# Patient Record
Sex: FEMALE | Race: WHITE | NOT HISPANIC OR LATINO | Employment: OTHER | URBAN - METROPOLITAN AREA
[De-identification: names, ages, dates, MRNs, and addresses within clinical notes are randomized per-mention and may not be internally consistent; named-entity substitution may affect disease eponyms.]

---

## 2021-05-18 ENCOUNTER — OFFICE VISIT (OUTPATIENT)
Dept: OBGYN CLINIC | Facility: CLINIC | Age: 86
End: 2021-05-18
Payer: MEDICARE

## 2021-05-18 VITALS
HEIGHT: 64 IN | SYSTOLIC BLOOD PRESSURE: 160 MMHG | DIASTOLIC BLOOD PRESSURE: 88 MMHG | BODY MASS INDEX: 33.29 KG/M2 | WEIGHT: 195 LBS

## 2021-05-18 DIAGNOSIS — N95.0 POSTMENOPAUSAL BLEEDING: Primary | ICD-10-CM

## 2021-05-18 DIAGNOSIS — K59.00 CONSTIPATION, UNSPECIFIED CONSTIPATION TYPE: ICD-10-CM

## 2021-05-18 PROBLEM — N81.10 PELVIC ORGAN PROLAPSE QUANTIFICATION STAGE 1 CYSTOCELE: Status: ACTIVE | Noted: 2018-02-16

## 2021-05-18 PROCEDURE — 88342 IMHCHEM/IMCYTCHM 1ST ANTB: CPT | Performed by: PATHOLOGY

## 2021-05-18 PROCEDURE — 88344 IMHCHEM/IMCYTCHM EA MLT ANTB: CPT | Performed by: PATHOLOGY

## 2021-05-18 PROCEDURE — 88305 TISSUE EXAM BY PATHOLOGIST: CPT | Performed by: PATHOLOGY

## 2021-05-18 PROCEDURE — 58100 BIOPSY OF UTERUS LINING: CPT | Performed by: NURSE PRACTITIONER

## 2021-05-18 RX ORDER — ACETAMINOPHEN 500 MG
500 TABLET ORAL EVERY 6 HOURS PRN
COMMUNITY

## 2021-05-18 RX ORDER — SIMVASTATIN 10 MG
TABLET ORAL
COMMUNITY
Start: 2021-02-27

## 2021-05-18 NOTE — PROGRESS NOTES
Endometrial biopsy    Date/Time: 5/18/2021 3:01 PM  Performed by: ANGELICA Anaya  Authorized by: ANGELICA Anaya   Universal Protocol:  Consent: Verbal consent obtained  Risks and benefits: risks, benefits and alternatives were discussed  Consent given by: patient  Timeout called at: 5/18/2021 3:01 PM   Patient understanding: patient states understanding of the procedure being performed  Patient consent: the patient's understanding of the procedure matches consent given  Procedure consent: procedure consent matches procedure scheduled  Relevant documents: relevant documents present and verified  Test results: test results available and properly labeled  Radiology Images displayed and confirmed  If images not available, report reviewed: imaging studies available  Required items: required blood products, implants, devices, and special equipment available  Patient identity confirmed: verbally with patient      Indication:     Indications: Post-menopausal bleeding      Chronicity of post-menopausal bleeding:  New    Progression of post-menopausal bleeding:  Waxing and waning  Procedure:     Procedure: endometrial biopsy with Pipelle      A bivalve speculum was placed in the vagina: yes      Cervix cleaned and prepped: yes      A paracervical block was performed: no      An intracervical block was performed: no      The cervix was dilated: yes      Uterus sounded: yes      Uterus sound depth (cm):  9    Specimen collected: specimen collected and sent to pathology      Patient tolerated procedure well with no complications: yes    Findings:     Uterus size:  Non-gravid  Comments:     Procedure comments: Will check pelvic u/s  EMB done  Will call her with a plan once results are in

## 2021-05-18 NOTE — PATIENT INSTRUCTIONS
Postmenopausal Bleeding   WHAT YOU NEED TO KNOW:   What do I need to know about postmenopausal bleeding? Postmenopausal bleeding is bleeding that occurs after menopause  A woman who has not had a period for a full year after the age of 36 is considered to be in menopause  Postmenopausal bleeding may range from spotting to very heavy bleeding  What causes postmenopausal bleeding? · Thinning of the endometrium (lining of the uterus) called endometrial atrophy    · Polyps (noncancerous growths) that develop on the inner wall of your uterus or cervix    · Hormone replacement therapy    · Abnormal thickening of the endometrium called endometrial hyperplasia    · Tamoxifen (medicine used to treat breast cancer)    · Cervical, endometrial, or uterine cancer    How is postmenopausal bleeding diagnosed? Your healthcare provider will ask about medical conditions that you have, and that run in your family  He will also do a pelvic exam to check for problems with your cervix, uterus, and ovaries  You may also need any of the following:  · An ultrasound  uses sound waves to show pictures of your uterus on a monitor  Your healthcare provider may place saline fluid into your uterus with a catheter  The fluid helps show more detail in the ultrasound pictures of your uterus  · Endometrial biopsy  is used to collect a sample of cells from the inside of your uterus to be tested for cancer  · Hysteroscopy  is a procedure that is done to look inside your uterus  A small scope with a light and camera is placed into your vagina and cervix  Liquid or gas may be put through the scope to help healthcare providers see better  · Dilation and curettage (D&C)  is a procedure to remove tissue from the lining of your uterus  The tissue is sent to a lab for tests  How is postmenopausal bleeding treated? Treatment depends on the cause of your postmenopausal bleeding  If you have polyps, you may need surgery to remove them  Endometrial atrophy can be treated with medicines  Endometrial hyperplasia may be treated with progestin hormone therapy  Surgery to remove your uterus will be needed if you have endometrial or uterine cancer  Your fallopian tubes, ovaries, and nearby lymph nodes may also be removed  When should I contact my healthcare provider? · You continue to have vaginal bleeding, even with treatment  · You have pain in your abdomen or pelvis  · You have questions or concerns about your condition or care  CARE AGREEMENT:   You have the right to help plan your care  Learn about your health condition and how it may be treated  Discuss treatment options with your healthcare providers to decide what care you want to receive  You always have the right to refuse treatment  The above information is an  only  It is not intended as medical advice for individual conditions or treatments  Talk to your doctor, nurse or pharmacist before following any medical regimen to see if it is safe and effective for you  © Copyright [address] Information is for End User's use only and may not be sold, redistributed or otherwise used for commercial purposes   All illustrations and images included in CareNotes® are the copyrighted property of [employer]  or [address] [place] [address]

## 2021-05-26 ENCOUNTER — TELEPHONE (OUTPATIENT)
Dept: OBGYN CLINIC | Facility: CLINIC | Age: 86
End: 2021-05-26

## 2021-05-26 NOTE — TELEPHONE ENCOUNTER
----- Message from Sara Figueroa sent at 5/26/2021  3:21 PM EDT -----  Regarding: U/S appointment  Please find out when patient plans to do her pelvic u/s? Her EMB result is back but I need more info with her u/s so we can come up with a plan  Thanks

## 2021-05-26 NOTE — TELEPHONE ENCOUNTER
Pt forgot about the us, I gave her the phone # for scheduling and asked to to call today to make the apt

## 2021-06-07 ENCOUNTER — HOSPITAL ENCOUNTER (OUTPATIENT)
Dept: RADIOLOGY | Facility: MEDICAL CENTER | Age: 86
Discharge: HOME | End: 2021-06-07
Payer: MEDICARE

## 2021-06-07 DIAGNOSIS — N95.0 POSTMENOPAUSAL BLEEDING: ICD-10-CM

## 2021-06-07 PROCEDURE — 76856 US EXAM PELVIC COMPLETE: CPT

## 2021-06-07 PROCEDURE — 76830 TRANSVAGINAL US NON-OB: CPT

## 2021-06-16 ENCOUNTER — TELEPHONE (OUTPATIENT)
Dept: OBGYN CLINIC | Facility: CLINIC | Age: 86
End: 2021-06-16

## 2021-06-16 NOTE — TELEPHONE ENCOUNTER
Called radiology and they are getting it corrected  Radiology sent me a msg that they did NOT see a normal uterus, thus none present  Per msg  [facility] :  "We did not see one, maybe a CT might help  I saw an EPIC not of hysterectomy by GYN from 10/15/18 "      ----- Message from [facility] sent at 6/16/2021  8:58 AM EDT -----  Pls call to have report corrected   It says nml uterus but patient had a hysterectomy

## 2021-07-19 ENCOUNTER — ANNUAL EXAM (OUTPATIENT)
Dept: OBGYN CLINIC | Facility: CLINIC | Age: 86
End: 2021-07-19
Payer: MEDICARE

## 2021-07-19 VITALS
WEIGHT: 194 LBS | BODY MASS INDEX: 33.12 KG/M2 | DIASTOLIC BLOOD PRESSURE: 74 MMHG | SYSTOLIC BLOOD PRESSURE: 130 MMHG | HEIGHT: 64 IN

## 2021-07-19 DIAGNOSIS — Z78.0 ASYMPTOMATIC POSTMENOPAUSAL STATUS: ICD-10-CM

## 2021-07-19 DIAGNOSIS — Z01.419 ENCOUNTER FOR GYNECOLOGICAL EXAMINATION WITHOUT ABNORMAL FINDING: Primary | ICD-10-CM

## 2021-07-19 DIAGNOSIS — Z12.31 SCREENING MAMMOGRAM, ENCOUNTER FOR: ICD-10-CM

## 2021-07-19 PROCEDURE — G0101 CA SCREEN;PELVIC/BREAST EXAM: HCPCS | Performed by: NURSE PRACTITIONER

## 2021-07-19 RX ORDER — FUROSEMIDE 20 MG/1
20 TABLET ORAL DAILY
COMMUNITY
End: 2021-12-09 | Stop reason: ALTCHOICE

## 2021-07-19 RX ORDER — POLYETHYLENE GLYCOL 3350 17 G/17G
POWDER, FOR SOLUTION ORAL
COMMUNITY

## 2021-07-19 RX ORDER — ICOSAPENT ETHYL 1000 MG/1
CAPSULE ORAL
COMMUNITY

## 2021-07-19 NOTE — PATIENT INSTRUCTIONS
Breast Self Exam for Women   AMBULATORY CARE:   A breast self-exam (BSE)  is a way to check your breasts for lumps and other changes  Regular BSEs can help you know how your breasts normally look and feel  Most breast lumps or changes are not cancer, but you should always have them checked by a healthcare provider  Why you should do a BSE:  Breast cancer is the most common type of cancer in women  Even if you have mammograms, you may still want to do a BSE regularly  If you know how your breasts normally feel and look, it may help you know when to contact your healthcare provider  Mammograms can miss some cancers  You may find a lump during a BSE that did not show up on a mammogram   When you should do a BSE:  If you have periods, you may want to do your BSE 1 week after your period ends  This is the time when your breasts may be the least swollen, lumpy, or tender  You can do regular BSEs even if you are breastfeeding or have breast implants  Call your doctor if:   · You find any lumps or changes in your breasts  · You have breast pain or fluid coming from your nipples  · You have questions or concerns about your condition or care  How to do a BSE:       · Look at your breasts in a mirror  Look at the size and shape of each breast and nipple  Check for swelling, lumps, dimpling, scaly skin, or other skin changes  Look for nipple changes, such as a nipple that is painful or beginning to pull inward  Gently squeeze both nipples and check to see if fluid (that is not breast milk) comes out of them  If you find any of these or other breast changes, contact your healthcare provider  Check your breasts while you sit or  the following 3 positions:    ? Hang your arms down at your sides  ? Raise your hands and join them behind your head  ? Put firm pressure with your hands on your hips  Bend slightly forward while you look at your breasts in the mirror  · Lie down and feel your breasts    When you lie down, your breast tissue spreads out evenly over your chest  This makes it easier for you to feel for lumps and anything that may not be normal for your breasts  Do a BSE on one breast at a time  ? Place a small pillow or towel under your left shoulder  Put your left arm behind your head  ? Use the 3 middle fingers of your right hand  Use your fingertip pads, on the top of your fingers  Your fingertip pad is the most sensitive part of your finger  ? Use small circles to feel your breast tissue  Use your fingertip pads to make dime-sized, overlapping circles on your breast and armpits  Use light, medium, and firm pressure  First, press lightly  Second, press with medium pressure to feel a little deeper into the breast  Last, use firm pressure to feel deep within your breast     ? Examine your entire breast area  Examine the breast area from above the breast to below the breast where you feel only ribs  Make small circles with your fingertips, starting in the middle of your armpit  Make circles going up and down the breast area  Continue toward your breast and all the way across it  Examine the area from your armpit all the way over to the middle of your chest (breastbone)  Stop at the middle of your chest     ? Move the pillow or towel to your right shoulder, and put your right arm behind your head  Use the 3 fingertip pads of your left hand, and repeat the above steps to do a BSE on your right breast   What else you can do to check for breast problems or cancer:  Talk to your healthcare provider about mammograms  A mammogram is an x-ray of your breasts to screen for breast cancer or other problems  Your provider can tell you the benefits and risks of mammograms  The first mammogram is usually at age 39 or 48  Your provider may recommend you start at 36 or younger if your risk for breast cancer is high  Mammograms usually continue every 1 to 2 years until age 76       Follow up with your doctor as directed:  Write down your questions so you remember to ask them during your visits  © Copyright [address] 2021 Information is for End User's use only and may not be sold, redistributed or otherwise used for commercial purposes  All illustrations and images included in CareNotes® are the copyrighted property of A D A M , Inc  or Destiny Martinez  The above information is an  only  It is not intended as medical advice for individual conditions or treatments  Talk to your doctor, nurse or pharmacist before following any medical regimen to see if it is safe and effective for you

## 2021-07-19 NOTE — PROGRESS NOTES
Diagnoses and all orders for this visit:    Encounter for gynecological examination without abnormal finding    Asymptomatic postmenopausal status    Screening mammogram, encounter for  -     Mammo screening bilateral w 3d & cad; Future    Call as needed, encouraged calcium/vit D in her diet, call with any PMB, all questions answered      Pleasant 80 y o  postmenopausal female here for annual exam  She denies further postmenopausal bleeding  History of hysterectomy and Bladder Cancer  Denies history of abnormal pap smears, last Pap years ago, no further paps indicated  Denies vaginal issues  Denies pelvic pain  Denies postmenopausal issues  Not sexually active  Last colonoscopy unknown  Last mammogram 11/2020    History reviewed  No pertinent past medical history  Past Surgical History:   Procedure Laterality Date    BACK SURGERY      COLONOSCOPY W/ POLYPECTOMY      HYSTERECTOMY W/ SALPINGO-OOPHERECTOMY       History reviewed  No pertinent family history    Social History     Tobacco Use    Smoking status: Never Smoker    Smokeless tobacco: Never Used   Substance Use Topics    Alcohol use: Never    Drug use: Never       Current Outpatient Medications:     acetaminophen (TYLENOL) 500 mg tablet, Take 500 mg by mouth every 6 (six) hours as needed for mild pain, Disp: , Rfl:     furosemide (LASIX) 20 mg tablet, Take 20 mg by mouth daily, Disp: , Rfl:     polyethylene glycol (MiraLax Mix-In [place]) 17 g packet, Take by mouth, Disp: , Rfl:     simvastatin (ZOCOR) 10 mg tablet, TAKE ONE TABLET WITH SUPPER, Disp: , Rfl:     Icosapent Ethyl 1 g CAPS, Take by mouth (Patient not taking: Reported on 7/19/2021), Disp: , Rfl:   Patient Active Problem List    Diagnosis Date Noted    Pelvic organ prolapse quantification stage 1 cystocele 02/16/2018    Bladder cancer ([facility] [address] ) 08/09/2016    Rectocele 08/09/2016       Allergies   Allergen Reactions    Bactrim [Sulfamethoxazole-Trimethoprim] Swelling    Sulfa Antibiotics Swelling       OB History    Para Term  AB Living   6 6       6   SAB TAB Ectopic Multiple Live Births                  # Outcome Date GA Lbr Simone/2nd Weight Sex Delivery Anes PTL Lv   6 Para            5 Para            4 Para            3 Para            2 Para            1 Para              Too many grandchildren and greats, lost count    Vitals:    21 1333   BP: 130/74   BP Location: Left arm   Patient Position: Sitting   Cuff Size: Standard   Weight: 88 kg (194 lb)   Height: 5' 4" (1 626 m)     Body mass index is 33 3 kg/m²  Review of Systems   Constitutional: Negative for chills, fatigue, fever and unexpected weight change  Respiratory: Negative for shortness of breath  Gastrointestinal: Negative for anal bleeding, blood in stool, constipation and diarrhea  Genitourinary: Negative for difficulty urinating, dysuria and hematuria  Physical Exam   Constitutional: She appears well-developed and well-nourished  No distress  HENT: atraumatic, EOMI  Head: Normocephalic  Neck: Normal range of motion  Neck supple  Pulmonary: Effort normal   Breasts: bilateral without masses, skin changes or nipple discharge  Bilaterally soft and warm to touch  No areas of erythema or pain  Abdominal: Soft  Pelvic exam was performed with patient supine  No labial fusion  There is no rash, tenderness, lesion or injury on the right labia  There is no rash, tenderness, lesion or injury on the left labia  Urethral meatus does not show any tenderness, inflammation or discharge  Palpation of midline bladder without pain or discomfort  Uterus/cervix ABSENT  Right adnexum displays no mass, no tenderness and no fullness  Left adnexum displays no mass, no tenderness and no fullness  No erythema or tenderness in the vagina  No foreign body in the vagina  No signs of injury around the vagina or anus  Perineum without lesions, signs of injury, erythema or swelling  No vaginal discharge found   MODERATE VAGINAL ATROPHY  Lymphadenopathy:        Right: No inguinal adenopathy present  Left: No inguinal adenopathy present

## 2021-11-29 ENCOUNTER — TELEPHONE (OUTPATIENT)
Dept: OTHER | Facility: OTHER | Age: 86
End: 2021-11-29

## 2021-12-09 ENCOUNTER — OFFICE VISIT (OUTPATIENT)
Dept: GASTROENTEROLOGY | Facility: CLINIC | Age: 86
End: 2021-12-09
Payer: MEDICARE

## 2021-12-09 VITALS
RESPIRATION RATE: 18 BRPM | SYSTOLIC BLOOD PRESSURE: 146 MMHG | BODY MASS INDEX: 31.38 KG/M2 | WEIGHT: 183.8 LBS | HEIGHT: 64 IN | DIASTOLIC BLOOD PRESSURE: 82 MMHG

## 2021-12-09 DIAGNOSIS — K59.00 CONSTIPATION, UNSPECIFIED CONSTIPATION TYPE: ICD-10-CM

## 2021-12-09 DIAGNOSIS — K59.04 CHRONIC IDIOPATHIC CONSTIPATION: Primary | ICD-10-CM

## 2021-12-09 PROCEDURE — 99204 OFFICE O/P NEW MOD 45 MIN: CPT | Performed by: PHYSICIAN ASSISTANT

## 2023-02-20 RX ORDER — POLYETHYLENE GLYCOL 3350 17 G/17G
POWDER, FOR SOLUTION ORAL
COMMUNITY

## 2023-02-20 RX ORDER — BEMPEDOIC ACID AND EZETIMIBE 180; 10 MG/1; MG/1
TABLET, FILM COATED ORAL
COMMUNITY

## 2023-02-20 RX ORDER — LACTULOSE 10 G/15ML
SOLUTION ORAL
COMMUNITY

## 2023-02-20 RX ORDER — DIAZEPAM 5 MG/1
TABLET ORAL
COMMUNITY

## 2023-02-20 RX ORDER — CIPROFLOXACIN 500 MG/1
TABLET, FILM COATED ORAL
COMMUNITY

## 2023-02-20 RX ORDER — VILAZODONE HYDROCHLORIDE 10 MG/1
TABLET ORAL
COMMUNITY

## 2023-02-20 RX ORDER — PREGABALIN 75 MG/1
CAPSULE ORAL
COMMUNITY

## 2023-02-20 RX ORDER — VILAZODONE HYDROCHLORIDE 20 MG/1
20 TABLET ORAL
COMMUNITY

## 2023-02-20 RX ORDER — FLUCONAZOLE 150 MG/1
TABLET ORAL
COMMUNITY

## 2023-02-20 RX ORDER — MIRABEGRON 50 MG/1
TABLET, FILM COATED, EXTENDED RELEASE ORAL
COMMUNITY

## 2023-02-20 RX ORDER — ASPIRIN 81 MG/1
TABLET ORAL EVERY 24 HOURS
COMMUNITY

## 2023-02-20 RX ORDER — KETOCONAZOLE 20 MG/G
CREAM TOPICAL
COMMUNITY

## 2023-02-20 RX ORDER — OXYCODONE HYDROCHLORIDE 5 MG/1
TABLET ORAL
COMMUNITY

## 2023-02-20 RX ORDER — PYRAZINAMIDE 500 MG/1
1-2 TABLET ORAL EVERY 4 HOURS PRN
COMMUNITY

## 2023-02-20 RX ORDER — FENOFIBRATE 90 MG/1
CAPSULE ORAL
COMMUNITY

## 2023-02-20 RX ORDER — NAPROXEN SODIUM 220 MG
TABLET ORAL
COMMUNITY

## 2023-02-20 RX ORDER — ZOSTER VACCINE RECOMBINANT, ADJUVANTED 50 MCG/0.5
KIT INTRAMUSCULAR
COMMUNITY

## 2023-02-23 ENCOUNTER — TELEPHONE (OUTPATIENT)
Dept: GASTROENTEROLOGY | Facility: AMBULARY SURGERY CENTER | Age: 88
End: 2023-02-23

## 2023-02-23 NOTE — TELEPHONE ENCOUNTER
Patients GI provider:  Dr Cha     Number to return call: [phone]    Reason for call: Pt's partner named Godfrey Escalona called requesting to speak with Barbara Viveros to clarify where pt is going tomorrow because on the message that was left it said that pt should be at the hospital      Scheduled procedure/appointment date if applicable: Apt/procedure 2-

## 2023-02-24 ENCOUNTER — HOSPITAL ENCOUNTER (OUTPATIENT)
Dept: RADIOLOGY | Facility: HOSPITAL | Age: 88
End: 2023-02-24

## 2023-02-24 ENCOUNTER — OFFICE VISIT (OUTPATIENT)
Dept: GASTROENTEROLOGY | Facility: CLINIC | Age: 88
End: 2023-02-24

## 2023-02-24 ENCOUNTER — APPOINTMENT (OUTPATIENT)
Dept: LAB | Facility: HOSPITAL | Age: 88
End: 2023-02-24

## 2023-02-24 VITALS
WEIGHT: 185.6 LBS | SYSTOLIC BLOOD PRESSURE: 140 MMHG | OXYGEN SATURATION: 98 % | BODY MASS INDEX: 30.92 KG/M2 | HEIGHT: 65 IN | DIASTOLIC BLOOD PRESSURE: 80 MMHG | HEART RATE: 82 BPM

## 2023-02-24 DIAGNOSIS — R79.9 ABNORMAL FINDING OF BLOOD CHEMISTRY, UNSPECIFIED: ICD-10-CM

## 2023-02-24 DIAGNOSIS — R19.7 OVERFLOW DIARRHEA: ICD-10-CM

## 2023-02-24 DIAGNOSIS — K59.00 CONSTIPATION, UNSPECIFIED CONSTIPATION TYPE: Primary | ICD-10-CM

## 2023-02-24 DIAGNOSIS — K59.00 CONSTIPATION, UNSPECIFIED CONSTIPATION TYPE: ICD-10-CM

## 2023-02-24 DIAGNOSIS — K64.8 INTERNAL HEMORRHOIDS: ICD-10-CM

## 2023-02-24 LAB
ALBUMIN SERPL BCP-MCNC: 3.6 G/DL (ref 3.5–5)
ALP SERPL-CCNC: 75 U/L (ref 46–116)
ALT SERPL W P-5'-P-CCNC: 18 U/L (ref 12–78)
ANION GAP SERPL CALCULATED.3IONS-SCNC: 9 MMOL/L (ref 4–13)
AST SERPL W P-5'-P-CCNC: 23 U/L (ref 5–45)
BILIRUB SERPL-MCNC: 0.44 MG/DL (ref 0.2–1)
BUN SERPL-MCNC: 15 MG/DL (ref 5–25)
CALCIUM SERPL-MCNC: 8.9 MG/DL (ref 8.3–10.1)
CHLORIDE SERPL-SCNC: 102 MMOL/L (ref 96–108)
CO2 SERPL-SCNC: 27 MMOL/L (ref 21–32)
CREAT SERPL-MCNC: 0.93 MG/DL (ref 0.6–1.3)
ERYTHROCYTE [DISTWIDTH] IN BLOOD BY AUTOMATED COUNT: 12.4 % (ref 11.6–15.1)
FERRITIN SERPL-MCNC: 140 NG/ML (ref 8–388)
GFR SERPL CREATININE-BSD FRML MDRD: 55 ML/MIN/1.73SQ M
GLUCOSE SERPL-MCNC: 117 MG/DL (ref 65–140)
HCT VFR BLD AUTO: 40.8 % (ref 34.8–46.1)
HGB BLD-MCNC: 13.3 G/DL (ref 11.5–15.4)
IRON SATN MFR SERPL: 29 % (ref 15–50)
IRON SERPL-MCNC: 79 UG/DL (ref 50–170)
MCH RBC QN AUTO: 29 PG (ref 26.8–34.3)
MCHC RBC AUTO-ENTMCNC: 32.6 G/DL (ref 31.4–37.4)
MCV RBC AUTO: 89 FL (ref 82–98)
PLATELET # BLD AUTO: 276 THOUSANDS/UL (ref 149–390)
PMV BLD AUTO: 8.7 FL (ref 8.9–12.7)
POTASSIUM SERPL-SCNC: 4.3 MMOL/L (ref 3.5–5.3)
PROT SERPL-MCNC: 7.7 G/DL (ref 6.4–8.4)
RBC # BLD AUTO: 4.58 MILLION/UL (ref 3.81–5.12)
SODIUM SERPL-SCNC: 138 MMOL/L (ref 135–147)
TIBC SERPL-MCNC: 277 UG/DL (ref 250–450)
TSH SERPL DL<=0.05 MIU/L-ACNC: 2.04 UIU/ML (ref 0.45–4.5)
WBC # BLD AUTO: 10.07 THOUSAND/UL (ref 4.31–10.16)

## 2023-02-24 RX ORDER — HYDROCORTISONE 25 MG/G
CREAM TOPICAL 2 TIMES DAILY
Qty: 28 G | Refills: 0 | Status: SHIPPED | OUTPATIENT
Start: 2023-02-24

## 2023-02-24 RX ORDER — HYDROCORTISONE ACETATE 25 MG/1
25 SUPPOSITORY RECTAL 2 TIMES DAILY
Qty: 12 SUPPOSITORY | Refills: 0 | Status: SHIPPED | OUTPATIENT
Start: 2023-02-24

## 2023-02-24 NOTE — PROGRESS NOTES
[facility]) [facility]  Jennifer Restrepo 80 y o  female MRN: [id number]       CC: Alternating diarrhea and constipation, rectal bleeding    HPI: Moncho Rm is an 80year old female who presents to the office today with her  and one of their friends who was able to drive him for the office visit  Patient presents to the office for alternating diarrhea and constipation, that has been chronic   reports that she will have small bits of stool, followed by a day or 2 of large amount of diarrhea  Patient reports that she had 1 week of bright red blood per rectum without clotting  She denies melena or hematochezia  She reports that she does not take MiraLAX or Colace consistently since she is afraid of having diarrhea  She denies abdominal pain  Her last colonoscopy was in 2016  She has a personal history of colon polyps  Internal hemorrhoids were noted at the time  There is no family history of colon cancer to the patient or patient's  knowledge  Review of Systems:    CONSTITUTIONAL: Denies any fever, chills, or rigors  Good appetite, and no recent weight loss  HEENT: No earache or tinnitus  Denies hearing loss or visual disturbances  CARDIOVASCULAR: No chest pain or palpitations  RESPIRATORY: Denies any cough, hemoptysis, shortness of breath or dyspnea on exertion  GASTROINTESTINAL: As noted in the History of Present Illness  GENITOURINARY: No problems with urination  Denies any hematuria or dysuria  NEUROLOGIC: No dizziness or vertigo, denies headaches  MUSCULOSKELETAL: Denies any muscle or joint pain  SKIN: Denies skin rashes or itching  ENDOCRINE: Denies excessive thirst  Denies intolerance to heat or cold  PSYCHOSOCIAL: Denies depression or anxiety  Denies any recent memory loss         Current Outpatient Medications   Medication Sig Dispense Refill   • acetaminophen (TYLENOL) 500 mg tablet Take 500 mg by mouth every 6 (six) hours as needed for mild pain     • acetaminophen-codeine (TYLENOL with CODEINE #3) 300-30 MG per tablet Take 1-2 tablets by mouth every 4 (four) hours as needed     • aspirin (ECOTRIN LOW STRENGTH) 81 mg EC tablet every 24 hours     • Bempedoic Acid-Ezetimibe (Nexlizet) 180-10 MG TABS Nexlizet 180 mg-10 mg tablet   TAKE ONE (1) TABLET DAILY WITH SUPPER (STOP VASCEPA AND SIMVASTATIN)     • Docusate Sodium (COLACE PO) Take by mouth     • hydrocortisone (ANUSOL-HC) 2 5 % rectal cream Apply topically 2 (two) times a day 28 g 0   • hydrocortisone (ANUSOL-HC) 25 mg suppository Insert 1 suppository (25 mg total) into the rectum 2 (two) times a day 12 suppository 0   • lactulose (CHRONULAC) 10 g/15 mL solution lactulose 10 gram/15 mL oral solution     • naproxen sodium (ALEVE) 220 MG tablet Take by mouth     • polyethylene glycol (MIRALAX) 17 g packet Take by mouth     • vilazodone (VIIBRYD) 10 mg tablet Viibryd 10 mg tablet     • Zoster Vac Recomb Adjuvanted (Shingrix) 50 MCG/0 5ML SUSR Shingrix (PF) 50 mcg/0 5 mL intramuscular suspension, kit     • ciprofloxacin (CIPRO) 500 mg tablet ciprofloxacin 500 mg tablet (Patient not taking: Reported on 2/24/2023)     • diazepam (VALIUM) 5 mg tablet diazepam 5 mg tablet (Patient not taking: Reported on 2/24/2023)     • Diclofenac Sodium (VOLTAREN) 1 % diclofenac 1 % topical gel (Patient not taking: Reported on 2/24/2023)     • Fenofibrate Micronized 90 MG CAPS Antara 90 mg capsule (Patient not taking: Reported on 2/24/2023)     • fluconazole (DIFLUCAN) 150 mg tablet fluconazole 150 mg tablet (Patient not taking: Reported on 2/24/2023)     • Icosapent Ethyl 1 g CAPS Take by mouth   (Patient not taking: Reported on 2/24/2023)     • ketoconazole (NIZORAL) 2 % cream ketoconazole 2 % topical cream (Patient not taking: Reported on 2/24/2023)     • Mirabegron ER (Myrbetriq) 50 MG TB24 Take by mouth (Patient not taking: Reported on 2/24/2023)     • oxyCODONE (ROXICODONE) 5 immediate release tablet oxycodone 5 mg tablet (Patient not taking: Reported on 2/24/2023)     • polyethylene glycol (MIRALAX) 17 g packet Take by mouth (Patient not taking: Reported on 12/9/2021)     • pregabalin (LYRICA) 75 mg capsule Lyrica 75 mg capsule (Patient not taking: Reported on 2/24/2023)     • simvastatin (ZOCOR) 10 mg tablet TAKE ONE TABLET WITH SUPPER (Patient not taking: Reported on 2/24/2023)     • vilazodone (VIIBRYD) 20 mg tablet Take 20 mg by mouth (Patient not taking: Reported on 2/24/2023)       No current facility-administered medications for this visit  History reviewed  No pertinent past medical history  Past Surgical History:   Procedure Laterality Date   • BACK SURGERY     • COLONOSCOPY W/ POLYPECTOMY     • HYSTERECTOMY W/ SALPINGO-OOPHERECTOMY       Social History     Socioeconomic History   • Marital status: /Civil Union     Spouse name: None   • Number of children: None   • Years of education: None   • Highest education level: None   Occupational History   • None   Tobacco Use   • Smoking status: Never   • Smokeless tobacco: Never   Vaping Use   • Vaping Use: Never used   Substance and Sexual Activity   • Alcohol use: Never   • Drug use: Never   • Sexual activity: Not Currently   Other Topics Concern   • None   Social History Narrative   • None     Social Determinants of Health     Financial Resource Strain: Not on file   Food Insecurity: Not on file   Transportation Needs: Not on file   Physical Activity: Not on file   Stress: Not on file   Social Connections: Not on file   Intimate Partner Violence: Not on file   Housing Stability: Not on file     History reviewed  No pertinent family history  PHYSICAL EXAM:    Vitals:    02/24/23 0924   BP: 140/80   BP Location: Left arm   Patient Position: Sitting   Cuff Size: Standard   Pulse: 82   SpO2: 98%   Weight: 84 2 kg (185 lb 9 6 oz)   Height: 5' 4 5" (1 638 m)     General Appearance:   Alert and oriented x 3   Cooperative, and in no respiratory distress   HEENT:   Normocephalic, atraumatic, anicteric      Neck:  Supple, symmetrical, trachea midline   Lungs:   Clear to auscultation bilaterally    Heart[de-identified]   Regular rate and rhythm   Abdomen:   Soft, non-tender, non-distended; normal bowel sounds; no masses, no organomegaly    Genitalia:   Deferred    Rectal:   Deferred    Extremities:  No cyanosis, clubbing or edema    Pulses:  2+ and symmetric all extremities    Skin:  Skin color, texture, turgor normal, no rashes or lesions    Lymph nodes:  No palpable cervical or supraclavicular lymphadenopathy        Lab Results:             Invalid input(s): LABALBU            Imaging Studies: I have personally reviewed pertinent imaging studies  US pelvis complete w transvaginal    Addendum Date: 6/16/2021    ADDENDUM: Sonographic findings and report reviewed with Mini Red  CT or MR evaluation may be obtained to evaluate for uterus, adnexa  This sonographic exam did not demonstrate uterus or ovarian structures  There is a history of hysterectomy from OB/GYN note dated 10/15/18    Result Date: 6/16/2021  Impression:  Normal uterus, ovaries not identified  There is a history of hysterectomy from OB/GYN note of 10/15/18 Workstation performed: [id number]       ASSESSMENT and PLAN:      1) Alternating diarrhea and constipation - Patient reports she does not take a laxative or fiber supplement daily  It appears she used to be on Linzess in 2021  Would like to avoid Linzess as it may cause dehydration in elderly patients  We discussed the importance of fiber, movement and water intake for bowel regularity  Last CT was in 2022, which was unremarkable from a GI standpoint except for a hiatal hernia and diverticulosis     - Start metamucil daily   - When in a period of constipation, she should start Miralax 1/2 a capful daily   - High fiber hand out provided   - Will order KUB, patient's friend will be able to take her to the hospital after appointment     2) Rectal bleeding - Last colonoscopy in 2016, with internal hemorrhoids noted  Patient reports bleeding occurred for 1 week, then stopped  May be hemorrhoidal given alternating bowel habits  She reports that at her age, should would like to avoid a colonoscopy  We discussed risks and benefits    - CBC  - Anusol cream and suppository ordered  - If bleeding becomes consistent with clotting or signs of symptomatic anemia, she should be seen in the nearest emergency room  She and her  voice understanding  Follow up in 6-8 weeks

## 2023-02-27 ENCOUNTER — TELEPHONE (OUTPATIENT)
Dept: GASTROENTEROLOGY | Facility: CLINIC | Age: 88
End: 2023-02-27

## 2023-02-27 NOTE — TELEPHONE ENCOUNTER
----- Message from Duong Abernathy PA-C sent at 2/26/2023  4:05 PM EST -----  Please let patient know that her labs are normal  No signs of anemia or loss of iron which is great news  Her X-Ray showed a lot of stool in the rectum, and I think is the reason why she sometimes has watery bowel movements  Thanks!

## 2023-04-25 ENCOUNTER — OFFICE VISIT (OUTPATIENT)
Dept: GASTROENTEROLOGY | Facility: CLINIC | Age: 88
End: 2023-04-25

## 2023-04-25 VITALS
HEART RATE: 80 BPM | BODY MASS INDEX: 31.29 KG/M2 | DIASTOLIC BLOOD PRESSURE: 76 MMHG | OXYGEN SATURATION: 97 % | SYSTOLIC BLOOD PRESSURE: 124 MMHG | WEIGHT: 187.8 LBS | HEIGHT: 65 IN

## 2023-04-25 DIAGNOSIS — R19.7 OVERFLOW DIARRHEA: Primary | ICD-10-CM

## 2023-04-25 RX ORDER — SACCHAROMYCES BOULARDII 250 MG
250 CAPSULE ORAL 2 TIMES DAILY
Qty: 60 CAPSULE | Refills: 2 | Status: SHIPPED | OUTPATIENT
Start: 2023-04-25 | End: 2023-05-25

## 2023-04-25 NOTE — PROGRESS NOTES
[facility]) [facility]  Brittny Fat 80 y o  female MRN: [id number]       CC: Follow-up    HPI: César Cooley is an 55-year-old female who presents the office today with her   Patient was last seen by me in February with symptoms of alternating diarrhea and constipation that has been chronic  Patient reports that she did forget to purchase a fiber supplement  She had a KUB that did not show increased stool burden  She denies unintentional weight loss or abdominal pain  Her rectal bleeding has resolved  Patient did mention today that her son passed away, and she is not sure if he had lung or colon cancer  Her last colonoscopy was in 2016  She has a personal history of colon polyps  Internal hemorrhoids were noted at the time  Review of Systems:    CONSTITUTIONAL: Denies any fever, chills, or rigors  Good appetite, and no recent weight loss  HEENT: No earache or tinnitus  Denies hearing loss or visual disturbances  CARDIOVASCULAR: No chest pain or palpitations  RESPIRATORY: Denies any cough, hemoptysis, shortness of breath or dyspnea on exertion  GASTROINTESTINAL: As noted in the History of Present Illness  GENITOURINARY: No problems with urination  Denies any hematuria or dysuria  NEUROLOGIC: No dizziness or vertigo, denies headaches  MUSCULOSKELETAL: Denies any muscle or joint pain  SKIN: Denies skin rashes or itching  ENDOCRINE: Denies excessive thirst  Denies intolerance to heat or cold  PSYCHOSOCIAL: Denies depression or anxiety  Denies any recent memory loss         Current Outpatient Medications   Medication Sig Dispense Refill   • acetaminophen (TYLENOL) 500 mg tablet Take 500 mg by mouth every 6 (six) hours as needed for mild pain     • acetaminophen-codeine (TYLENOL with CODEINE #3) 300-30 MG per tablet Take 1-2 tablets by mouth every 4 (four) hours as needed     • aspirin (ECOTRIN LOW STRENGTH) 81 mg EC tablet every 24 hours     • Bempedoic Acid-Ezetimibe (Nexlizet) 180-10 MG TABS Nexlizet 180 mg-10 mg tablet   TAKE ONE (1) TABLET DAILY WITH SUPPER (STOP VASCEPA AND SIMVASTATIN)     • ciprofloxacin (CIPRO) 500 mg tablet ciprofloxacin 500 mg tablet (Patient not taking: Reported on 2/24/2023)     • diazepam (VALIUM) 5 mg tablet diazepam 5 mg tablet (Patient not taking: Reported on 2/24/2023)     • Diclofenac Sodium (VOLTAREN) 1 % diclofenac 1 % topical gel (Patient not taking: Reported on 2/24/2023)     • Docusate Sodium (COLACE PO) Take by mouth     • Fenofibrate Micronized 90 MG CAPS Antara 90 mg capsule (Patient not taking: Reported on 2/24/2023)     • fluconazole (DIFLUCAN) 150 mg tablet fluconazole 150 mg tablet (Patient not taking: Reported on 2/24/2023)     • hydrocortisone (ANUSOL-HC) 2 5 % rectal cream Apply topically 2 (two) times a day 28 g 0   • hydrocortisone (ANUSOL-HC) 25 mg suppository Insert 1 suppository (25 mg total) into the rectum 2 (two) times a day 12 suppository 0   • Icosapent Ethyl 1 g CAPS Take by mouth   (Patient not taking: Reported on 2/24/2023)     • ketoconazole (NIZORAL) 2 % cream ketoconazole 2 % topical cream (Patient not taking: Reported on 2/24/2023)     • lactulose (CHRONULAC) 10 g/15 mL solution lactulose 10 gram/15 mL oral solution     • Mirabegron ER (Myrbetriq) 50 MG TB24 Take by mouth (Patient not taking: Reported on 2/24/2023)     • naproxen sodium (ALEVE) 220 MG tablet Take by mouth     • oxyCODONE (ROXICODONE) 5 immediate release tablet oxycodone 5 mg tablet (Patient not taking: Reported on 2/24/2023)     • polyethylene glycol (MIRALAX) 17 g packet Take by mouth (Patient not taking: Reported on 12/9/2021)     • polyethylene glycol (MIRALAX) 17 g packet Take by mouth     • pregabalin (LYRICA) 75 mg capsule Lyrica 75 mg capsule (Patient not taking: Reported on 2/24/2023)     • simvastatin (ZOCOR) 10 mg tablet TAKE ONE TABLET WITH SUPPER (Patient not taking: Reported on 2/24/2023)     • vilazodone (VIIBRYD) 10 mg tablet Viibryd 10 mg tablet     • vilazodone (VIIBRYD) 20 mg tablet Take 20 mg by mouth (Patient not taking: Reported on 2/24/2023)     • Zoster Vac Recomb Adjuvanted (Shingrix) 50 MCG/0 5ML SUSR Shingrix (PF) 50 mcg/0 5 mL intramuscular suspension, kit       No current facility-administered medications for this visit  No past medical history on file  Past Surgical History:   Procedure Laterality Date   • BACK SURGERY     • COLONOSCOPY W/ POLYPECTOMY     • HYSTERECTOMY W/ SALPINGO-OOPHERECTOMY       Social History     Socioeconomic History   • Marital status: /Civil Union     Spouse name: Not on file   • Number of children: Not on file   • Years of education: Not on file   • Highest education level: Not on file   Occupational History   • Not on file   Tobacco Use   • Smoking status: Never   • Smokeless tobacco: Never   Vaping Use   • Vaping Use: Never used   Substance and Sexual Activity   • Alcohol use: Never   • Drug use: Never   • Sexual activity: Not Currently   Other Topics Concern   • Not on file   Social History Narrative   • Not on file     Social Determinants of Health     Financial Resource Strain: Not on file   Food Insecurity: Not on file   Transportation Needs: Not on file   Physical Activity: Not on file   Stress: Not on file   Social Connections: Not on file   Intimate Partner Violence: Not on file   Housing Stability: Not on file     No family history on file  PHYSICAL EXAM:    There were no vitals filed for this visit  General Appearance:   Alert and oriented x 3   Cooperative, and in no respiratory distress   HEENT:   Normocephalic, atraumatic, anicteric      Neck:  Supple, symmetrical, trachea midline   Lungs:   Clear to auscultation bilaterally    Heart[de-identified]   Regular rate and rhythm   Abdomen:   Soft, non-tender, non-distended; normal bowel sounds; no masses, no organomegaly    Genitalia:   Deferred    Rectal:   Deferred    Extremities:  No cyanosis, clubbing or edema    Pulses:  2+ and symmetric all extremities    Skin:  Skin color, texture, turgor normal, no rashes or lesions    Lymph nodes:  No palpable cervical or supraclavicular lymphadenopathy        Lab Results:   Results from last 6 Months   Lab Units 02/24/23  1054   WBC Thousand/uL 10 07   HEMOGLOBIN g/dL 13 3   HEMATOCRIT % 40 8   PLATELETS Thousands/uL 276     Results from last 6 Months   Lab Units 02/24/23  1054   POTASSIUM mmol/L 4 3   CHLORIDE mmol/L 102   CO2 mmol/L 27   BUN mg/dL 15   CREATININE mg/dL 0 93   CALCIUM mg/dL 8 9   ALK PHOS U/L 75   ALT U/L 18   AST U/L 23               Imaging Studies: I have personally reviewed pertinent imaging studies  XR abdomen 1 view kub    Result Date: 2/28/2023  Impression: No significant stool  Unremarkable abdomen  Workstation performed: [id number]       ASSESSMENT and PLAN:      1) Alternating bowel habits - Patient had CT imaging in 2022, which was unremarkable for GI standpoint except for hiatal hernia and diverticulosis  Patient is unsure which cancer her son passed away from  Ultimately, the patient reports that she is likely not interested in a colonoscopy given increased risks of in her advanced age  We may need to consider an alternative such as CT colonography or Cologuard  I will have the office contact her later this week after she speaks with her daughter-in-law to ask about her son's history and that this is a first-degree family member  Purchase fiber supplement such as Metamucil or Benefiber  Between now and her next follow-up, we went over alarm symptoms that would warrant her to contact the office sooner  Follow up in 8 weeks

## 2023-04-28 ENCOUNTER — TELEPHONE (OUTPATIENT)
Dept: GASTROENTEROLOGY | Facility: CLINIC | Age: 88
End: 2023-04-28

## 2023-04-28 DIAGNOSIS — Z80.0 FAMILY HISTORY OF COLON CANCER: Primary | ICD-10-CM

## 2023-04-28 NOTE — TELEPHONE ENCOUNTER
Called and spoke to patient  Patient is not sure what type of cancer son had  She will ask her daughter in law when she gets out of work and will call us back to let us know

## 2023-04-28 NOTE — TELEPHONE ENCOUNTER
----- Message from Filippo Alcantara PA-C sent at 4/25/2023 10:23 AM EDT -----  Please call patient to ask her what type of cancer her son had  Patient was not sure if it was colon or lung cancer  Thank you

## 2023-07-10 ENCOUNTER — TELEPHONE (OUTPATIENT)
Dept: GASTROENTEROLOGY | Facility: CLINIC | Age: 88
End: 2023-07-10

## 2023-07-10 ENCOUNTER — OFFICE VISIT (OUTPATIENT)
Dept: GASTROENTEROLOGY | Facility: CLINIC | Age: 88
End: 2023-07-10
Payer: MEDICARE

## 2023-07-10 VITALS
DIASTOLIC BLOOD PRESSURE: 70 MMHG | HEIGHT: 65 IN | WEIGHT: 188.8 LBS | OXYGEN SATURATION: 97 % | SYSTOLIC BLOOD PRESSURE: 136 MMHG | BODY MASS INDEX: 31.46 KG/M2 | HEART RATE: 77 BPM

## 2023-07-10 DIAGNOSIS — K59.00 CONSTIPATION, UNSPECIFIED CONSTIPATION TYPE: ICD-10-CM

## 2023-07-10 DIAGNOSIS — Z80.0 FAMILY HISTORY OF COLON CANCER: Primary | ICD-10-CM

## 2023-07-10 PROCEDURE — 99214 OFFICE O/P EST MOD 30 MIN: CPT | Performed by: PHYSICIAN ASSISTANT

## 2023-07-10 NOTE — TELEPHONE ENCOUNTER
----- Message from Juan Chambers PA-C sent at 7/10/2023  9:44 AM EDT -----  Please schedule CT colonoscopy for patient at the [facility]. When you speak with them again, please remind them to  the prep from the hospital least 3 days before the appointment. Thank you.

## 2023-07-10 NOTE — TELEPHONE ENCOUNTER
Called and spoke with Clementine Canada from central scheduling. The next available appt they have for the CT colonoscopy is for 11/9/2023 @8:30am at the [address] [place] [address]. We scheduled the pt for that date and time.

## 2023-07-10 NOTE — TELEPHONE ENCOUNTER
Called and [facility] for pt that she was scheduled for a CT colonoscopy at the [address] [place] [address] for 10/9/2023 @8:30am. She will need to get the prep 3 days prior which will be on 10/6/2023. Any questions she can give us a call here at the office.

## 2023-07-10 NOTE — PROGRESS NOTES
[address] [place] [address] [facility]  Elliott Barnard 80 y.o. female MRN: [id number]       CC: Follow-up, recently reported family history of colon cancer in patient's son    HPI: Arias Carmen is an 80year old female who presents to the office today for follow-up. She has chronic alternating bowel habits, with new onset bright red blood per rectum without clotting per patient's  that began this year. Lately, the patient had more recent shift to constipation. Patient then later reported her son  from cancer, but did not know which type. Her daughter in law was able to confirm colon/lung cancer. She is on Colace twice daily. She denies unintentional weight loss. Her last colonoscopy was in 2016. She has a personal history of colon polyps. Internal hemorrhoids were noted at the time. Review of Systems:    CONSTITUTIONAL: Denies any fever, chills, or rigors. Good appetite, and no recent weight loss. HEENT: No earache or tinnitus. Denies hearing loss or visual disturbances. CARDIOVASCULAR: No chest pain or palpitations. RESPIRATORY: Denies any cough, hemoptysis, shortness of breath or dyspnea on exertion. GASTROINTESTINAL: As noted in the History of Present Illness. GENITOURINARY: No problems with urination. Denies any hematuria or dysuria. NEUROLOGIC: No dizziness or vertigo, denies headaches. MUSCULOSKELETAL: Denies any muscle or joint pain. SKIN: Denies skin rashes or itching. ENDOCRINE: Denies excessive thirst. Denies intolerance to heat or cold. PSYCHOSOCIAL: Denies depression or anxiety. Denies any recent memory loss.        Current Outpatient Medications   Medication Sig Dispense Refill   • acetaminophen (TYLENOL) 500 mg tablet Take 500 mg by mouth every 6 (six) hours as needed for mild pain     • acetaminophen-codeine (TYLENOL with CODEINE #3) 300-30 MG per tablet Take 1-2 tablets by mouth every 4 (four) hours as needed     • aspirin (ECOTRIN LOW STRENGTH) 81 mg EC tablet every 24 hours     • Docusate Sodium (COLACE PO) Take by mouth     • hydrocortisone (ANUSOL-HC) 2.5 % rectal cream Apply topically 2 (two) times a day 28 g 0   • hydrocortisone (ANUSOL-HC) 25 mg suppository Insert 1 suppository (25 mg total) into the rectum 2 (two) times a day 12 suppository 0   • ketoconazole (NIZORAL) 2 % cream      • lactulose (CHRONULAC) 10 g/15 mL solution lactulose 10 gram/15 mL oral solution     • polyethylene glycol (MIRALAX) 17 g packet Take by mouth     • vilazodone (VIIBRYD) 10 mg tablet Viibryd 10 mg tablet     • Zoster Vac Recomb Adjuvanted (Shingrix) 50 MCG/0.5ML SUSR Shingrix (PF) 50 mcg/0.5 mL intramuscular suspension, kit     • Bempedoic Acid-Ezetimibe (Nexlizet) 180-10 MG TABS Nexlizet 180 mg-10 mg tablet   TAKE ONE (1) TABLET DAILY WITH SUPPER (STOP VASCEPA AND SIMVASTATIN) (Patient not taking: Reported on 7/10/2023)     • ciprofloxacin (CIPRO) 500 mg tablet ciprofloxacin 500 mg tablet (Patient not taking: Reported on 2/24/2023)     • diazepam (VALIUM) 5 mg tablet diazepam 5 mg tablet (Patient not taking: Reported on 2/24/2023)     • Diclofenac Sodium (VOLTAREN) 1 % diclofenac 1 % topical gel (Patient not taking: Reported on 2/24/2023)     • Fenofibrate Micronized 90 MG CAPS Antara 90 mg capsule (Patient not taking: Reported on 2/24/2023)     • fluconazole (DIFLUCAN) 150 mg tablet fluconazole 150 mg tablet (Patient not taking: Reported on 2/24/2023)     • Icosapent Ethyl 1 g CAPS Take by mouth   (Patient not taking: Reported on 2/24/2023)     • Mirabegron ER (Myrbetriq) 50 MG TB24 Take by mouth (Patient not taking: Reported on 2/24/2023)     • naproxen sodium (ALEVE) 220 MG tablet Take by mouth     • oxyCODONE (ROXICODONE) 5 immediate release tablet oxycodone 5 mg tablet (Patient not taking: Reported on 2/24/2023)     • polyethylene glycol (MIRALAX) 17 g packet Take by mouth (Patient not taking: Reported on 12/9/2021)     • pregabalin (LYRICA) 75 mg capsule Lyrica 75 mg capsule (Patient not taking: Reported on 2/24/2023)     • simvastatin (ZOCOR) 10 mg tablet TAKE ONE TABLET WITH SUPPER (Patient not taking: Reported on 2/24/2023)     • vilazodone (VIIBRYD) 20 mg tablet Take 20 mg by mouth (Patient not taking: Reported on 2/24/2023)       No current facility-administered medications for this visit. History reviewed. No pertinent past medical history. Past Surgical History:   Procedure Laterality Date   • BACK SURGERY     • COLONOSCOPY W/ POLYPECTOMY     • HYSTERECTOMY W/ SALPINGO-OOPHERECTOMY       Social History     Socioeconomic History   • Marital status: /Civil Union     Spouse name: None   • Number of children: None   • Years of education: None   • Highest education level: None   Occupational History   • None   Tobacco Use   • Smoking status: Never   • Smokeless tobacco: Never   Vaping Use   • Vaping Use: Never used   Substance and Sexual Activity   • Alcohol use: Never   • Drug use: Never   • Sexual activity: Not Currently   Other Topics Concern   • None   Social History Narrative   • None     Social Determinants of Health     Financial Resource Strain: Not on file   Food Insecurity: Not on file   Transportation Needs: Not on file   Physical Activity: Not on file   Stress: Not on file   Social Connections: Not on file   Intimate Partner Violence: Not on file   Housing Stability: Not on file     History reviewed. No pertinent family history. PHYSICAL EXAM:    Vitals:    07/10/23 0921   BP: 136/70   BP Location: Right arm   Patient Position: Sitting   Cuff Size: Standard   Pulse: 77   SpO2: 97%   Weight: 85.6 kg (188 lb 12.8 oz)   Height: 5' 4.5" (1.638 m)     General Appearance:   Alert and oriented x 3. Cooperative, and in no respiratory distress   HEENT:   Normocephalic, atraumatic, anicteric.      Neck:  Supple, symmetrical, trachea midline   Lungs:   Clear to auscultation bilaterally    Heart[de-identified]   Regular rate and rhythm   Abdomen:   Soft, non-tender, non-distended; normal bowel sounds; no masses, no organomegaly    Genitalia:   Deferred    Rectal:   Deferred    Extremities:  No cyanosis, clubbing or edema    Pulses:  2+ and symmetric all extremities    Skin:  Skin color, texture, turgor normal, no rashes or lesions    Lymph nodes:  No palpable cervical or supraclavicular lymphadenopathy        Lab Results:   Results from last 6 Months   Lab Units 02/24/23  1054   WBC Thousand/uL 10.07   HEMOGLOBIN g/dL 13.3   HEMATOCRIT % 40.8   PLATELETS Thousands/uL 276     Results from last 6 Months   Lab Units 02/24/23  1054   POTASSIUM mmol/L 4.3   CHLORIDE mmol/L 102   CO2 mmol/L 27   BUN mg/dL 15   CREATININE mg/dL 0.93   CALCIUM mg/dL 8.9   ALK PHOS U/L 75   ALT U/L 18   AST U/L 23               Imaging Studies:   XR abdomen 1 view kub    Result Date: 2/28/2023  Impression: No significant stool. Unremarkable abdomen. Workstation performed: [id number]       ASSESSMENT and PLAN:      1) Family history of colon cancer in patient's son, constipation and BRBPR - Patient's last colonoscopy was in 2016. Her daughter in law was able to confirm that patient's son passed away from colon cancer. Patient cannot have Cologuard since she has increased risk of colon cancer given first degree family member.   - Patient would prefer not to be sedated given her advanced age   - She is willing to have CT colonoscopy instead to investigate, we will help patient schedule   - Continue bowel regimen of Colace twice daily as she has been doing       Follow up after CT colonoscopy. Portions of the record may have been created with voice recognition software. Occasional wrong word or "sound a like" substitutions may have occurred due to the inherent limitations of voice recognition software. Read the chart carefully and recognize, using context, where substitutions have occurred.

## 2023-08-29 ENCOUNTER — APPOINTMENT (EMERGENCY)
Dept: CT IMAGING | Facility: HOSPITAL | Age: 88
End: 2023-08-29
Payer: MEDICARE

## 2023-08-29 ENCOUNTER — HOSPITAL ENCOUNTER (EMERGENCY)
Facility: HOSPITAL | Age: 88
Discharge: HOME | End: 2023-08-30
Payer: MEDICARE

## 2023-08-29 DIAGNOSIS — K57.90 DIVERTICULOSIS: ICD-10-CM

## 2023-08-29 DIAGNOSIS — K92.1 HEMATOCHEZIA: Primary | ICD-10-CM

## 2023-08-29 LAB
ALBUMIN SERPL BCP-MCNC: 4.1 G/DL (ref 3.5–5)
ALP SERPL-CCNC: 55 U/L (ref 34–104)
ALT SERPL W P-5'-P-CCNC: 15 U/L (ref 7–52)
ANION GAP SERPL CALCULATED.3IONS-SCNC: 9 MMOL/L
APTT PPP: 33 SECONDS (ref 23–37)
AST SERPL W P-5'-P-CCNC: 24 U/L (ref 13–39)
BASOPHILS # BLD AUTO: 0.09 THOUSANDS/ÂΜL (ref 0–0.1)
BASOPHILS NFR BLD AUTO: 1 % (ref 0–1)
BILIRUB SERPL-MCNC: 0.49 MG/DL (ref 0.2–1)
BUN SERPL-MCNC: 14 MG/DL (ref 5–25)
CALCIUM SERPL-MCNC: 9.3 MG/DL (ref 8.4–10.2)
CHLORIDE SERPL-SCNC: 104 MMOL/L (ref 96–108)
CO2 SERPL-SCNC: 24 MMOL/L (ref 21–32)
CREAT SERPL-MCNC: 0.9 MG/DL (ref 0.6–1.3)
EOSINOPHIL # BLD AUTO: 0.35 THOUSAND/ÂΜL (ref 0–0.61)
EOSINOPHIL NFR BLD AUTO: 4 % (ref 0–6)
ERYTHROCYTE [DISTWIDTH] IN BLOOD BY AUTOMATED COUNT: 12.9 % (ref 11.6–15.1)
GFR SERPL CREATININE-BSD FRML MDRD: 57 ML/MIN/1.73SQ M
GLUCOSE SERPL-MCNC: 139 MG/DL (ref 65–140)
HCT VFR BLD AUTO: 38.4 % (ref 34.8–46.1)
HGB BLD-MCNC: 13 G/DL (ref 11.5–15.4)
IMM GRANULOCYTES # BLD AUTO: 0.02 THOUSAND/UL (ref 0–0.2)
IMM GRANULOCYTES NFR BLD AUTO: 0 % (ref 0–2)
INR PPP: 1.05 (ref 0.84–1.19)
LIPASE SERPL-CCNC: 31 U/L (ref 11–82)
LYMPHOCYTES # BLD AUTO: 3.75 THOUSANDS/ÂΜL (ref 0.6–4.47)
LYMPHOCYTES NFR BLD AUTO: 42 % (ref 14–44)
MCH RBC QN AUTO: 30.2 PG (ref 26.8–34.3)
MCHC RBC AUTO-ENTMCNC: 33.9 G/DL (ref 31.4–37.4)
MCV RBC AUTO: 89 FL (ref 82–98)
MONOCYTES # BLD AUTO: 0.88 THOUSAND/ÂΜL (ref 0.17–1.22)
MONOCYTES NFR BLD AUTO: 10 % (ref 4–12)
NEUTROPHILS # BLD AUTO: 3.78 THOUSANDS/ÂΜL (ref 1.85–7.62)
NEUTS SEG NFR BLD AUTO: 43 % (ref 43–75)
NRBC BLD AUTO-RTO: 0 /100 WBCS
PLATELET # BLD AUTO: 264 THOUSANDS/UL (ref 149–390)
PMV BLD AUTO: 8.7 FL (ref 8.9–12.7)
POTASSIUM SERPL-SCNC: 4 MMOL/L (ref 3.5–5.3)
PROT SERPL-MCNC: 7.4 G/DL (ref 6.4–8.4)
PROTHROMBIN TIME: 14.3 SECONDS (ref 11.6–14.5)
RBC # BLD AUTO: 4.3 MILLION/UL (ref 3.81–5.12)
SODIUM SERPL-SCNC: 137 MMOL/L (ref 135–147)
WBC # BLD AUTO: 8.87 THOUSAND/UL (ref 4.31–10.16)

## 2023-08-29 PROCEDURE — 99285 EMERGENCY DEPT VISIT HI MDM: CPT

## 2023-08-29 PROCEDURE — 85730 THROMBOPLASTIN TIME PARTIAL: CPT

## 2023-08-29 PROCEDURE — 80053 COMPREHEN METABOLIC PANEL: CPT

## 2023-08-29 PROCEDURE — 93005 ELECTROCARDIOGRAM TRACING: CPT

## 2023-08-29 PROCEDURE — 85610 PROTHROMBIN TIME: CPT

## 2023-08-29 PROCEDURE — 74176 CT ABD & PELVIS W/O CONTRAST: CPT

## 2023-08-29 PROCEDURE — 85025 COMPLETE CBC W/AUTO DIFF WBC: CPT

## 2023-08-29 PROCEDURE — 83690 ASSAY OF LIPASE: CPT

## 2023-08-29 PROCEDURE — 36415 COLL VENOUS BLD VENIPUNCTURE: CPT

## 2023-08-29 PROCEDURE — G1004 CDSM NDSC: HCPCS

## 2023-08-30 VITALS
HEART RATE: 79 BPM | DIASTOLIC BLOOD PRESSURE: 90 MMHG | TEMPERATURE: 97.9 F | RESPIRATION RATE: 19 BRPM | OXYGEN SATURATION: 97 % | SYSTOLIC BLOOD PRESSURE: 142 MMHG

## 2023-08-30 LAB
ATRIAL RATE: 77 BPM
P AXIS: 71 DEGREES
PR INTERVAL: 162 MS
QRS AXIS: -37 DEGREES
QRSD INTERVAL: 84 MS
QT INTERVAL: 388 MS
QTC INTERVAL: 439 MS
T WAVE AXIS: 46 DEGREES
VENTRICULAR RATE: 77 BPM

## 2023-08-30 PROCEDURE — 93010 ELECTROCARDIOGRAM REPORT: CPT | Performed by: INTERNAL MEDICINE

## 2023-08-30 NOTE — ED PROVIDER NOTES
History  Chief Complaint   Patient presents with   • Rectal Bleeding     Patient reports she noticed bleeding from rectum, while going to the bathroom x 2 days. Patient has issue on and off and has colonoscopy scheduled with Dr Jacque Dee in Oct.     Patient is a 51-year-old female presents to ED for painless rectal bleeding. States that this has been persistent for the past 2 days. States she has issues with constipation, she is managed on Dulcolax and other stool softeners. Reports that she does have a colonoscopy scheduled for early October. She is not managed on anticoagulation. She denies abdominal pain, vomiting, chest pain, palpitations, shortness of breath, headache and dizziness. No other acute complaints at this time. Prior to Admission Medications   Prescriptions Last Dose Informant Patient Reported? Taking?    Bempedoic Acid-Ezetimibe (Nexlizet) 180-10 MG TABS  Self Yes No   Sig: Nexlizet 180 mg-10 mg tablet   TAKE ONE (1) TABLET DAILY WITH SUPPER (STOP VASCEPA AND SIMVASTATIN)   Patient not taking: Reported on 7/10/2023   Diclofenac Sodium (VOLTAREN) 1 %  Self Yes No   Sig: diclofenac 1 % topical gel   Patient not taking: Reported on 2/24/2023   Docusate Sodium (COLACE PO)  Self Yes No   Sig: Take by mouth   Fenofibrate Micronized 90 MG CAPS  Self Yes No   Sig: Antara 90 mg capsule   Patient not taking: Reported on 2/24/2023   Icosapent Ethyl 1 g CAPS  Self Yes No   Sig: Take by mouth     Patient not taking: Reported on 2/24/2023   Mirabegron ER (Myrbetriq) 50 MG TB24  Self Yes No   Sig: Take by mouth   Patient not taking: Reported on 2/24/2023   Zoster Vac Recomb Adjuvanted (Shingrix) 50 MCG/0.5ML SUSR  Self Yes No   Sig: Shingrix (PF) 50 mcg/0.5 mL intramuscular suspension, kit   acetaminophen (TYLENOL) 500 mg tablet  Self Yes No   Sig: Take 500 mg by mouth every 6 (six) hours as needed for mild pain   acetaminophen-codeine (TYLENOL with CODEINE #3) 300-30 MG per tablet  Self Yes No   Sig: Take 1-2 tablets by mouth every 4 (four) hours as needed   aspirin (ECOTRIN LOW STRENGTH) 81 mg EC tablet  Self Yes No   Sig: every 24 hours   ciprofloxacin (CIPRO) 500 mg tablet  Self Yes No   Sig: ciprofloxacin 500 mg tablet   Patient not taking: Reported on 2/24/2023   diazepam (VALIUM) 5 mg tablet  Self Yes No   Sig: diazepam 5 mg tablet   Patient not taking: Reported on 2/24/2023   fluconazole (DIFLUCAN) 150 mg tablet  Self Yes No   Sig: fluconazole 150 mg tablet   Patient not taking: Reported on 2/24/2023   hydrocortisone (ANUSOL-HC) 2.5 % rectal cream  Self No No   Sig: Apply topically 2 (two) times a day   hydrocortisone (ANUSOL-HC) 25 mg suppository  Self No No   Sig: Insert 1 suppository (25 mg total) into the rectum 2 (two) times a day   ketoconazole (NIZORAL) 2 % cream  Self Yes No   lactulose (CHRONULAC) 10 g/15 mL solution  Self Yes No   Sig: lactulose 10 gram/15 mL oral solution   naproxen sodium (ALEVE) 220 MG tablet  Self Yes No   Sig: Take by mouth   oxyCODONE (ROXICODONE) 5 immediate release tablet  Self Yes No   Sig: oxycodone 5 mg tablet   Patient not taking: Reported on 2/24/2023   polyethylene glycol (MIRALAX) 17 g packet  Self Yes No   Sig: Take by mouth   Patient not taking: Reported on 12/9/2021   polyethylene glycol (MIRALAX) 17 g packet  Self Yes No   Sig: Take by mouth   pregabalin (LYRICA) 75 mg capsule  Self Yes No   Sig: Lyrica 75 mg capsule   Patient not taking: Reported on 2/24/2023   simvastatin (ZOCOR) 10 mg tablet  Self Yes No   Sig: TAKE ONE TABLET WITH SUPPER   Patient not taking: Reported on 2/24/2023   vilazodone (VIIBRYD) 10 mg tablet  Self Yes No   Sig: Viibryd 10 mg tablet   vilazodone (VIIBRYD) 20 mg tablet  Self Yes No   Sig: Take 20 mg by mouth   Patient not taking: Reported on 2/24/2023      Facility-Administered Medications: None       Past Medical History:   Diagnosis Date   • GI bleed    • High cholesterol    • Migraines        Past Surgical History:   Procedure Laterality Date   • BACK SURGERY     • COLONOSCOPY W/ POLYPECTOMY     • HYSTERECTOMY W/ SALPINGO-OOPHERECTOMY         History reviewed. No pertinent family history. I have reviewed and agree with the history as documented. E-Cigarette/Vaping   • E-Cigarette Use Never User      E-Cigarette/Vaping Substances     Social History     Tobacco Use   • Smoking status: Never   • Smokeless tobacco: Never   Vaping Use   • Vaping Use: Never used   Substance Use Topics   • Alcohol use: Never   • Drug use: Never       Review of Systems   Constitutional: Negative for chills and fever. HENT: Negative for ear pain and sore throat. Eyes: Negative for pain and visual disturbance. Respiratory: Negative for cough and shortness of breath. Cardiovascular: Negative for chest pain and palpitations. Gastrointestinal: Negative for abdominal pain and vomiting. Rectal bleeding   Genitourinary: Negative for dysuria and hematuria. Musculoskeletal: Negative for arthralgias and back pain. Skin: Negative for color change and rash. Neurological: Negative for seizures and syncope. All other systems reviewed and are negative. Physical Exam  Physical Exam  Vitals and nursing note reviewed. Constitutional:       General: She is not in acute distress. Appearance: She is well-developed. HENT:      Head: Normocephalic and atraumatic. Eyes:      Conjunctiva/sclera: Conjunctivae normal.   Cardiovascular:      Rate and Rhythm: Normal rate and regular rhythm. Heart sounds: No murmur heard. Pulmonary:      Effort: Pulmonary effort is normal. No respiratory distress. Breath sounds: Normal breath sounds. Abdominal:      Palpations: Abdomen is soft. Tenderness: There is abdominal tenderness. Musculoskeletal:         General: No swelling. Cervical back: Neck supple. Skin:     General: Skin is warm and dry. Capillary Refill: Capillary refill takes less than 2 seconds.    Neurological: Mental Status: She is alert.    Psychiatric:         Mood and Affect: Mood normal.         Vital Signs  ED Triage Vitals   Temperature Pulse Respirations Blood Pressure SpO2   08/29/23 2017 08/29/23 2016 08/29/23 2016 08/29/23 2016 08/29/23 2016   97.9 °F (36.6 °C) 83 19 156/76 97 %      Temp Source Heart Rate Source Patient Position - Orthostatic VS BP Location FiO2 (%)   08/29/23 2017 08/29/23 2016 08/29/23 2016 08/29/23 2016 --   Temporal Monitor Sitting Left arm       Pain Score       --                  Vitals:    08/29/23 2016 08/29/23 2041 08/29/23 2307 08/30/23 0000   BP: 156/76 125/64 129/68 142/90   Pulse: 83 78 71 79   Patient Position - Orthostatic VS: Sitting Lying Lying Lying         Visual Acuity      ED Medications  Medications - No data to display    Diagnostic Studies  Results Reviewed     Procedure Component Value Units Date/Time    Comprehensive metabolic panel [[id number]] Collected: 08/29/23 2059    Lab Status: Final result Specimen: Blood from Arm, Left Updated: 08/29/23 2126     Sodium 137 mmol/L      Potassium 4.0 mmol/L      Chloride 104 mmol/L      CO2 24 mmol/L      ANION GAP 9 mmol/L      BUN 14 mg/dL      Creatinine 0.90 mg/dL      Glucose 139 mg/dL      Calcium 9.3 mg/dL      AST 24 U/L      ALT 15 U/L      Alkaline Phosphatase 55 U/L      Total Protein 7.4 g/dL      Albumin 4.1 g/dL      Total Bilirubin 0.49 mg/dL      eGFR 57 ml/min/1.73sq m     Narrative:      [place] guidelines for Chronic Kidney Disease (CKD):   •  Stage 1 with normal or high GFR (GFR > 90 mL/min/1.73 square meters)  •  Stage 2 Mild CKD (GFR = 60-89 mL/min/1.73 square meters)  •  Stage 3A Moderate CKD (GFR = 45-59 mL/min/1.73 square meters)  •  Stage 3B Moderate CKD (GFR = 30-44 mL/min/1.73 square meters)  •  Stage 4 Severe CKD (GFR = 15-29 mL/min/1.73 square meters)  •  Stage 5 End Stage CKD (GFR <15 mL/min/1.73 square meters)  Note: GFR calculation is accurate only with a steady state creatinine    Lipase [[id number]]  (Normal) Collected: 08/29/23 2059    Lab Status: Final result Specimen: Blood from Arm, Left Updated: 08/29/23 2126     Lipase 31 u/L     Protime-INR [[id number]]  (Normal) Collected: 08/29/23 2059    Lab Status: Final result Specimen: Blood from Arm, Left Updated: 08/29/23 2125     Protime 14.3 seconds      INR 1.05    APTT [[id number]]  (Normal) Collected: 08/29/23 2059    Lab Status: Final result Specimen: Blood from Arm, Left Updated: 08/29/23 2125     PTT 33 seconds     CBC and differential [[id number]]  (Abnormal) Collected: 08/29/23 2059    Lab Status: Final result Specimen: Blood from Arm, Left Updated: 08/29/23 2107     WBC 8.87 Thousand/uL      RBC 4.30 Million/uL      Hemoglobin 13.0 g/dL      Hematocrit 38.4 %      MCV 89 fL      MCH 30.2 pg      MCHC 33.9 g/dL      RDW 12.9 %      MPV 8.7 fL      Platelets 457 Thousands/uL      nRBC 0 /100 WBCs      Neutrophils Relative 43 %      Immat GRANS % 0 %      Lymphocytes Relative 42 %      Monocytes Relative 10 %      Eosinophils Relative 4 %      Basophils Relative 1 %      Neutrophils Absolute 3.78 Thousands/µL      Immature Grans Absolute 0.02 Thousand/uL      Lymphocytes Absolute 3.75 Thousands/µL      Monocytes Absolute 0.88 Thousand/µL      Eosinophils Absolute 0.35 Thousand/µL      Basophils Absolute 0.09 Thousands/µL                  CT abdomen pelvis wo contrast   Final Result by Valery Marcum MD (08/29 2326)      1. Nonspecific 7 mm subpleural nodule in the right lower lobe 2 mm subpleural nodule in the left lower lobe. Recommend follow-up CT chest in 6 to 12 months per current Fleischner Society guidelines. 2.  Small hiatal hernia containing the proximal stomach. Descending and sigmoid diverticulosis without evidence of acute diverticulitis. No evidence for bowel obstruction, inflammation, appendicitis, obstructive uropathy, free air, or free fluid.    3.  Small 1 cm ovoid hypodensity along the right ventral upper abdominal wall could represent small superficial sebaceous cyst. Recommend correlation with physical exam findings/direct visualization. Workstation performed: [id number]                    Procedures  Procedures         ED Course                               SBIRT 20yo+    Flowsheet Row Most Recent Value   Initial Alcohol Screen: US AUDIT-C     1. How often do you have a drink containing alcohol? 0 Filed at: 08/29/2023 2020   2. How many drinks containing alcohol do you have on a typical day you are drinking? 0 Filed at: 08/29/2023 2020   3a. Male UNDER 65: How often do you have five or more drinks on one occasion? 0 Filed at: 08/29/2023 2020   3b. FEMALE Any Age, or MALE 65+: How often do you have 4 or more drinks on one occassion? 0 Filed at: 08/29/2023 2020   Audit-C Score 0 Filed at: 08/29/2023 2020   ASMITA: How many times in the past year have you. .. Used an illegal drug or used a prescription medication for non-medical reasons? Never Filed at: 08/29/2023 2020                    Medical Decision Making  Amount and/or Complexity of Data Reviewed  Labs: ordered. Radiology: ordered. Patient is a 81yo female who presents to the ED for rectal bleeding. She is not managed on anticoagulation. States she has this intermittently, however has been more consistent over the past 2 days. She denies abdominal pain, however on examination she had mild tenderness to palpation to left-mid quadrant. Labs are unremarkable, hemoglobin is stable. CT of abdomen/pelvis showed some incidental findings, which I reviewed with patient. Explained that there is diverticulosis, without evidence of diverticulitis, which can cause painless rectal bleeding. She has an outpatient colonoscopy scheduled for October 2023. Patient has remained hemodynamically stable in ED and is nontoxic appearing. Advised to closely follow up with PCP. Gave strict return precautions, verbalized understanding. Disposition  Final diagnoses:   Hematochezia   Diverticulosis     Time reflects when diagnosis was documented in both MDM as applicable and the Disposition within this note     Time User Action Codes Description Comment    8/29/2023 11:59 PM Taiow Chow [K92.1] Hematochezia     8/30/2023  1:07 AM Lelo Chaudhary [K57.90] Diverticulosis       ED Disposition     ED Disposition   Discharge    Condition   Stable    Date/Time   Tue Aug 29, 2023 11:59 PM    Comment   [facility] discharge to home/self care.                Follow-up Information     Follow up With Specialties Details Why Contact Info    Jammie Smith, DO General Practice In 2 days If symptoms worsen [address]  [address]  [address] [place] [id number]  [phone]            Discharge Medication List as of 8/30/2023 12:00 AM      CONTINUE these medications which have NOT CHANGED    Details   acetaminophen (TYLENOL) 500 mg tablet Take 500 mg by mouth every 6 (six) hours as needed for mild pain, Historical Med      acetaminophen-codeine (TYLENOL with CODEINE #3) 300-30 MG per tablet Take 1-2 tablets by mouth every 4 (four) hours as needed, Historical Med      aspirin (ECOTRIN LOW STRENGTH) 81 mg EC tablet every 24 hours, Historical Med      Bempedoic Acid-Ezetimibe (Nexlizet) 180-10 MG TABS Nexlizet 180 mg-10 mg tablet   TAKE ONE (1) TABLET DAILY WITH SUPPER (STOP VASCEPA AND SIMVASTATIN), Historical Med      ciprofloxacin (CIPRO) 500 mg tablet ciprofloxacin 500 mg tablet, Historical Med      diazepam (VALIUM) 5 mg tablet diazepam 5 mg tablet, Historical Med      Diclofenac Sodium (VOLTAREN) 1 % diclofenac 1 % topical gel, Historical Med      Docusate Sodium (COLACE PO) Take by mouth, Historical Med      Fenofibrate Micronized 90 MG CAPS Antara 90 mg capsule, Historical Med      fluconazole (DIFLUCAN) 150 mg tablet fluconazole 150 mg tablet, Historical Med      hydrocortisone (ANUSOL-HC) 2.5 % rectal cream Apply topically 2 (two) times a day, Starting Fri 2/24/2023, Normal      hydrocortisone (ANUSOL-HC) 25 mg suppository Insert 1 suppository (25 mg total) into the rectum 2 (two) times a day, Starting Fri 2/24/2023, Normal      Icosapent Ethyl 1 g CAPS Take by mouth  , Historical Med      ketoconazole (NIZORAL) 2 % cream Historical Med      lactulose (CHRONULAC) 10 g/15 mL solution lactulose 10 gram/15 mL oral solution, Historical Med      Mirabegron ER (Myrbetriq) 50 MG TB24 Take by mouth, Historical Med      naproxen sodium (ALEVE) 220 MG tablet Take by mouth, Historical Med      oxyCODONE (ROXICODONE) 5 immediate release tablet oxycodone 5 mg tablet, Historical Med      !! polyethylene glycol (MIRALAX) 17 g packet Take by mouth, Historical Med      !! polyethylene glycol (MIRALAX) 17 g packet Take by mouth, Historical Med      pregabalin (LYRICA) 75 mg capsule Lyrica 75 mg capsule, Historical Med      simvastatin (ZOCOR) 10 mg tablet TAKE ONE TABLET WITH SUPPER, Historical Med      !! vilazodone (VIIBRYD) 10 mg tablet Viibryd 10 mg tablet, Historical Med      !! vilazodone (VIIBRYD) 20 mg tablet Take 20 mg by mouth, Historical Med      Zoster Vac Recomb Adjuvanted (Shingrix) 50 MCG/0.5ML SUSR Shingrix (PF) 50 mcg/0.5 mL intramuscular suspension, kit, Historical Med       !! - Potential duplicate medications found. Please discuss with provider. No discharge procedures on file.     PDMP Review     None          ED Provider  Electronically Signed by           Brandi Venegas PA-C  08/30/23 7301

## 2023-10-09 ENCOUNTER — APPOINTMENT (EMERGENCY)
Dept: CT IMAGING | Facility: HOSPITAL | Age: 88
DRG: 393 | End: 2023-10-09
Payer: MEDICARE

## 2023-10-09 ENCOUNTER — HOSPITAL ENCOUNTER (INPATIENT)
Facility: HOSPITAL | Age: 88
LOS: 2 days | Discharge: HOME | DRG: 393 | End: 2023-10-12
Attending: EMERGENCY MEDICINE | Admitting: INTERNAL MEDICINE
Payer: MEDICARE

## 2023-10-09 ENCOUNTER — HOSPITAL ENCOUNTER (OUTPATIENT)
Dept: CT IMAGING | Facility: HOSPITAL | Age: 88
Discharge: HOME | End: 2023-10-09
Payer: MEDICARE

## 2023-10-09 DIAGNOSIS — K61.1 PERIRECTAL ABSCESS: ICD-10-CM

## 2023-10-09 DIAGNOSIS — Z80.0 FAMILY HISTORY OF COLON CANCER: ICD-10-CM

## 2023-10-09 DIAGNOSIS — T81.82XA: Primary | ICD-10-CM

## 2023-10-09 DIAGNOSIS — K59.00 CONSTIPATION, UNSPECIFIED CONSTIPATION TYPE: ICD-10-CM

## 2023-10-09 PROBLEM — F41.9 ANXIETY: Status: ACTIVE | Noted: 2023-10-09

## 2023-10-09 PROBLEM — K63.1 RECTAL PERFORATION (HCC): Status: ACTIVE | Noted: 2023-10-09

## 2023-10-09 LAB
ALBUMIN SERPL BCP-MCNC: 4.2 G/DL (ref 3.5–5)
ALBUMIN SERPL BCP-MCNC: 4.3 G/DL (ref 3.5–5)
ALP SERPL-CCNC: 68 U/L (ref 34–104)
ALP SERPL-CCNC: 69 U/L (ref 34–104)
ALT SERPL W P-5'-P-CCNC: 12 U/L (ref 7–52)
ALT SERPL W P-5'-P-CCNC: 14 U/L (ref 7–52)
ANION GAP SERPL CALCULATED.3IONS-SCNC: 10 MMOL/L
ANION GAP SERPL CALCULATED.3IONS-SCNC: 11 MMOL/L
AST SERPL W P-5'-P-CCNC: 24 U/L (ref 13–39)
AST SERPL W P-5'-P-CCNC: 26 U/L (ref 13–39)
BASOPHILS # BLD AUTO: 0.08 THOUSANDS/ÂΜL (ref 0–0.1)
BASOPHILS # BLD AUTO: 0.11 THOUSANDS/ÂΜL (ref 0–0.1)
BASOPHILS NFR BLD AUTO: 1 % (ref 0–1)
BASOPHILS NFR BLD AUTO: 1 % (ref 0–1)
BILIRUB SERPL-MCNC: 0.75 MG/DL (ref 0.2–1)
BILIRUB SERPL-MCNC: 0.83 MG/DL (ref 0.2–1)
BUN SERPL-MCNC: 10 MG/DL (ref 5–25)
BUN SERPL-MCNC: 11 MG/DL (ref 5–25)
CALCIUM SERPL-MCNC: 9.4 MG/DL (ref 8.4–10.2)
CALCIUM SERPL-MCNC: 9.6 MG/DL (ref 8.4–10.2)
CHLORIDE SERPL-SCNC: 102 MMOL/L (ref 96–108)
CHLORIDE SERPL-SCNC: 104 MMOL/L (ref 96–108)
CO2 SERPL-SCNC: 23 MMOL/L (ref 21–32)
CO2 SERPL-SCNC: 25 MMOL/L (ref 21–32)
CREAT SERPL-MCNC: 0.88 MG/DL (ref 0.6–1.3)
CREAT SERPL-MCNC: 0.98 MG/DL (ref 0.6–1.3)
EOSINOPHIL # BLD AUTO: 0.14 THOUSAND/ÂΜL (ref 0–0.61)
EOSINOPHIL # BLD AUTO: 0.24 THOUSAND/ÂΜL (ref 0–0.61)
EOSINOPHIL NFR BLD AUTO: 1 % (ref 0–6)
EOSINOPHIL NFR BLD AUTO: 2 % (ref 0–6)
ERYTHROCYTE [DISTWIDTH] IN BLOOD BY AUTOMATED COUNT: 12.8 % (ref 11.6–15.1)
ERYTHROCYTE [DISTWIDTH] IN BLOOD BY AUTOMATED COUNT: 12.8 % (ref 11.6–15.1)
GFR SERPL CREATININE-BSD FRML MDRD: 51 ML/MIN/1.73SQ M
GFR SERPL CREATININE-BSD FRML MDRD: 58 ML/MIN/1.73SQ M
GLUCOSE P FAST SERPL-MCNC: 111 MG/DL (ref 65–99)
GLUCOSE SERPL-MCNC: 111 MG/DL (ref 65–140)
GLUCOSE SERPL-MCNC: 146 MG/DL (ref 65–140)
HCT VFR BLD AUTO: 41.7 % (ref 34.8–46.1)
HCT VFR BLD AUTO: 43.4 % (ref 34.8–46.1)
HGB BLD-MCNC: 13.9 G/DL (ref 11.5–15.4)
HGB BLD-MCNC: 14.2 G/DL (ref 11.5–15.4)
IMM GRANULOCYTES # BLD AUTO: 0.02 THOUSAND/UL (ref 0–0.2)
IMM GRANULOCYTES # BLD AUTO: 0.03 THOUSAND/UL (ref 0–0.2)
IMM GRANULOCYTES NFR BLD AUTO: 0 % (ref 0–2)
IMM GRANULOCYTES NFR BLD AUTO: 0 % (ref 0–2)
LYMPHOCYTES # BLD AUTO: 3.99 THOUSANDS/ÂΜL (ref 0.6–4.47)
LYMPHOCYTES # BLD AUTO: 4.71 THOUSANDS/ÂΜL (ref 0.6–4.47)
LYMPHOCYTES NFR BLD AUTO: 31 % (ref 14–44)
LYMPHOCYTES NFR BLD AUTO: 47 % (ref 14–44)
MCH RBC QN AUTO: 29.6 PG (ref 26.8–34.3)
MCH RBC QN AUTO: 29.7 PG (ref 26.8–34.3)
MCHC RBC AUTO-ENTMCNC: 32.7 G/DL (ref 31.4–37.4)
MCHC RBC AUTO-ENTMCNC: 33.3 G/DL (ref 31.4–37.4)
MCV RBC AUTO: 89 FL (ref 82–98)
MCV RBC AUTO: 91 FL (ref 82–98)
MONOCYTES # BLD AUTO: 0.56 THOUSAND/ÂΜL (ref 0.17–1.22)
MONOCYTES # BLD AUTO: 0.87 THOUSAND/ÂΜL (ref 0.17–1.22)
MONOCYTES NFR BLD AUTO: 6 % (ref 4–12)
MONOCYTES NFR BLD AUTO: 7 % (ref 4–12)
NEUTROPHILS # BLD AUTO: 4.51 THOUSANDS/ÂΜL (ref 1.85–7.62)
NEUTROPHILS # BLD AUTO: 7.89 THOUSANDS/ÂΜL (ref 1.85–7.62)
NEUTS SEG NFR BLD AUTO: 44 % (ref 43–75)
NEUTS SEG NFR BLD AUTO: 60 % (ref 43–75)
NRBC BLD AUTO-RTO: 0 /100 WBCS
NRBC BLD AUTO-RTO: 0 /100 WBCS
PLATELET # BLD AUTO: 288 THOUSANDS/UL (ref 149–390)
PLATELET # BLD AUTO: 303 THOUSANDS/UL (ref 149–390)
PMV BLD AUTO: 8.6 FL (ref 8.9–12.7)
PMV BLD AUTO: 8.8 FL (ref 8.9–12.7)
POTASSIUM SERPL-SCNC: 4.1 MMOL/L (ref 3.5–5.3)
POTASSIUM SERPL-SCNC: 4.4 MMOL/L (ref 3.5–5.3)
PROT SERPL-MCNC: 7.8 G/DL (ref 6.4–8.4)
PROT SERPL-MCNC: 7.8 G/DL (ref 6.4–8.4)
RBC # BLD AUTO: 4.68 MILLION/UL (ref 3.81–5.12)
RBC # BLD AUTO: 4.79 MILLION/UL (ref 3.81–5.12)
SODIUM SERPL-SCNC: 137 MMOL/L (ref 135–147)
SODIUM SERPL-SCNC: 138 MMOL/L (ref 135–147)
WBC # BLD AUTO: 10.15 THOUSAND/UL (ref 4.31–10.16)
WBC # BLD AUTO: 13 THOUSAND/UL (ref 4.31–10.16)

## 2023-10-09 PROCEDURE — 85025 COMPLETE CBC W/AUTO DIFF WBC: CPT

## 2023-10-09 PROCEDURE — 99215 OFFICE O/P EST HI 40 MIN: CPT | Performed by: INTERNAL MEDICINE

## 2023-10-09 PROCEDURE — G1004 CDSM NDSC: HCPCS

## 2023-10-09 PROCEDURE — 74177 CT ABD & PELVIS W/CONTRAST: CPT

## 2023-10-09 PROCEDURE — 74261 CT COLONOGRAPHY DX: CPT

## 2023-10-09 PROCEDURE — 99285 EMERGENCY DEPT VISIT HI MDM: CPT

## 2023-10-09 PROCEDURE — 96361 HYDRATE IV INFUSION ADD-ON: CPT

## 2023-10-09 PROCEDURE — 99223 1ST HOSP IP/OBS HIGH 75: CPT | Performed by: STUDENT IN AN ORGANIZED HEALTH CARE EDUCATION/TRAINING PROGRAM

## 2023-10-09 PROCEDURE — 99204 OFFICE O/P NEW MOD 45 MIN: CPT | Performed by: STUDENT IN AN ORGANIZED HEALTH CARE EDUCATION/TRAINING PROGRAM

## 2023-10-09 PROCEDURE — 85025 COMPLETE CBC W/AUTO DIFF WBC: CPT | Performed by: STUDENT IN AN ORGANIZED HEALTH CARE EDUCATION/TRAINING PROGRAM

## 2023-10-09 PROCEDURE — 36415 COLL VENOUS BLD VENIPUNCTURE: CPT

## 2023-10-09 PROCEDURE — 96374 THER/PROPH/DIAG INJ IV PUSH: CPT

## 2023-10-09 PROCEDURE — 80053 COMPREHEN METABOLIC PANEL: CPT

## 2023-10-09 PROCEDURE — 99284 EMERGENCY DEPT VISIT MOD MDM: CPT

## 2023-10-09 PROCEDURE — 80053 COMPREHEN METABOLIC PANEL: CPT | Performed by: STUDENT IN AN ORGANIZED HEALTH CARE EDUCATION/TRAINING PROGRAM

## 2023-10-09 RX ORDER — HYDROMORPHONE HCL IN WATER/PF 6 MG/30 ML
0.2 PATIENT CONTROLLED ANALGESIA SYRINGE INTRAVENOUS EVERY 4 HOURS PRN
Status: DISCONTINUED | OUTPATIENT
Start: 2023-10-09 | End: 2023-10-12 | Stop reason: HOSPADM

## 2023-10-09 RX ORDER — ACETAMINOPHEN 325 MG/1
650 TABLET ORAL EVERY 6 HOURS PRN
Status: DISCONTINUED | OUTPATIENT
Start: 2023-10-09 | End: 2023-10-09

## 2023-10-09 RX ORDER — PRAVASTATIN SODIUM 40 MG
20 TABLET ORAL
Status: DISCONTINUED | OUTPATIENT
Start: 2023-10-09 | End: 2023-10-09

## 2023-10-09 RX ORDER — ENOXAPARIN SODIUM 100 MG/ML
40 INJECTION SUBCUTANEOUS DAILY
Status: DISCONTINUED | OUTPATIENT
Start: 2023-10-09 | End: 2023-10-09

## 2023-10-09 RX ORDER — VILAZODONE HYDROCHLORIDE 10 MG/1
10 TABLET ORAL
Status: DISCONTINUED | OUTPATIENT
Start: 2023-10-10 | End: 2023-10-09

## 2023-10-09 RX ORDER — FENTANYL CITRATE 50 UG/ML
25 INJECTION, SOLUTION INTRAMUSCULAR; INTRAVENOUS ONCE
Status: COMPLETED | OUTPATIENT
Start: 2023-10-09 | End: 2023-10-09

## 2023-10-09 RX ADMIN — PIPERACILLIN AND TAZOBACTAM 3.38 G: 36; 4.5 INJECTION, POWDER, FOR SOLUTION INTRAVENOUS at 18:47

## 2023-10-09 RX ADMIN — FENTANYL CITRATE 25 MCG: 50 INJECTION INTRAMUSCULAR; INTRAVENOUS at 10:02

## 2023-10-09 RX ADMIN — IOHEXOL 100 ML: 350 INJECTION, SOLUTION INTRAVENOUS at 11:06

## 2023-10-09 RX ADMIN — SODIUM CHLORIDE 1000 ML: 0.9 INJECTION, SOLUTION INTRAVENOUS at 10:05

## 2023-10-09 RX ADMIN — PIPERACILLIN AND TAZOBACTAM 3.38 G: 36; 4.5 INJECTION, POWDER, FOR SOLUTION INTRAVENOUS at 13:32

## 2023-10-09 RX ADMIN — ENOXAPARIN SODIUM 40 MG: 40 INJECTION SUBCUTANEOUS at 15:20

## 2023-10-09 NOTE — H&P
[address]  H&P  Name: Kelsey Juarez I  MRN: [id number]  Unit/Bed#: ED 28 I Date of Admission: 10/9/2023   Date of Service: 10/9/2023 I Hospital Day: 0    Assessment/Plan   Rectal perforation [facility])  Assessment & Plan  Px after abdominal pain and abnormal findings of CT colonoscopy diagnostic. Concern for rectal perf vs tear after receiving contrast enema. CT showing extensive perianal subcutaneous emphysema now extending along the anterior abdominal wall and into the retroperitoneal space. · ED discussed cased with surgery who recommended slim admission with GI consultation  · Surgery and GI consulted, appreciate recommendations  · Per surgery continue with conservation management  · Strict NPO   · Pain management  · Serial abdominal exam   · IV zosyn for now   · q6 hours cbc and cmp   · If pt shows signs of infection, may need to consider loop colostomy     Hypercholesteremia  Assessment & Plan  Pt is on asa 81 mg daily and statin  Resume when able to take PO     Chronic constipation  Assessment & Plan  Holding home dose of miralax and senna in setting of NPO and concern for perforation as below    Anxiety  Assessment & Plan  Resume home dose antidepressant when able to tolerate PO           VTE Pharmacologic Prophylaxis:   Moderate Risk (Score 3-4) - Pharmacological DVT Prophylaxis Contraindicated. Sequential Compression Devices Ordered. Code Status: Level 3 - DNAR and DNI   Discussion with family: Updated  ( and daughter) at bedside. Anticipated Length of Stay: Patient will be admitted on an observation basis with an anticipated length of stay of less than 2 midnights secondary to rectal tear. Total Time Spent on Date of Encounter in care of patient: 35 mins.  This time was spent on one or more of the following: performing physical exam; counseling and coordination of care; obtaining or reviewing history; documenting in the medical record; reviewing/ordering tests, medications or procedures; communicating with other healthcare professionals and discussing with patient's family/caregivers. Chief Complaint: abnormal CT colonoscopy    History of Present Illness:  Saji Carver is a 80 y.o. female with a PMH of cholesterolemia, anxiety, history of constipation who was seen today for CT colonoscopy, received contrast enema and developed anal air during imaging prompting further work-up in ED. patient states she has been having alternate issues with constipation and diarrhea. There is concern for rectal tear and perforation. Evaluated by general surgery in ED who recommended admission to [facility] with GI consultation for further monitoring. Patient currently only complains of some abdominal distention, bloating. Denies nausea or vomiting. States pain is well controlled. Is passing gas. Review of Systems:  Review of Systems   Constitutional: Negative for activity change, appetite change, chills, fatigue and fever. HENT: Negative for congestion, facial swelling and trouble swallowing. Eyes: Negative for photophobia, pain, discharge and visual disturbance. Respiratory: Negative for apnea, cough, chest tightness, shortness of breath and wheezing. Cardiovascular: Negative for chest pain and leg swelling. Gastrointestinal: Positive for abdominal pain. Negative for abdominal distention, blood in stool, constipation, diarrhea, nausea and vomiting. Endocrine: Negative for polyuria. Genitourinary: Negative for difficulty urinating, dysuria, flank pain and hematuria. Musculoskeletal: Negative for arthralgias and back pain. Skin: Negative for pallor and rash. Neurological: Negative for dizziness, tremors and weakness. Psychiatric/Behavioral: Negative for agitation, behavioral problems and suicidal ideas. The patient is not nervous/anxious. All other systems reviewed and are negative.       Past Medical and Surgical History:    Past Medical History: Diagnosis Date   • GI bleed    • High cholesterol    • Migraines        Past Surgical History:   Procedure Laterality Date   • BACK SURGERY     • COLONOSCOPY W/ POLYPECTOMY     • HYSTERECTOMY W/ SALPINGO-OOPHERECTOMY         Meds/Allergies:  Prior to Admission medications    Medication Sig Start Date End Date Taking?  Authorizing Provider   acetaminophen (TYLENOL) 500 mg tablet Take 500 mg by mouth every 6 (six) hours as needed for mild pain    Historical Provider, MD   acetaminophen-codeine (TYLENOL with CODEINE #3) 300-30 MG per tablet Take 1-2 tablets by mouth every 4 (four) hours as needed    Historical Provider, MD   aspirin (ECOTRIN LOW STRENGTH) 81 mg EC tablet every 24 hours    Historical Provider, MD   Docusate Sodium (COLACE PO) Take by mouth    Historical Provider, MD   hydrocortisone (ANUSOL-HC) 2.5 % rectal cream Apply topically 2 (two) times a day 2/24/23   Carleen Naik PA-C   hydrocortisone (ANUSOL-HC) 25 mg suppository Insert 1 suppository (25 mg total) into the rectum 2 (two) times a day 2/24/23   Carleen Naik PA-C   ketoconazole (NIZORAL) 2 % cream     Historical Provider, MD   lactulose (CHRONULAC) 10 g/15 mL solution lactulose 10 gram/15 mL oral solution    Historical Provider, MD   naproxen sodium (ALEVE) 220 MG tablet Take by mouth    Historical Provider, MD   polyethylene glycol (MIRALAX) 17 g packet Take by mouth    Historical Provider, MD   simvastatin (ZOCOR) 10 mg tablet TAKE ONE TABLET WITH SUPPER  Patient not taking: Reported on 2/24/2023 2/27/21   Historical Provider, MD   vilazodone (VIIBRYD) 10 mg tablet Viibryd 10 mg tablet    Historical Provider, MD   Zoster Vac Recomb Adjuvanted (Shingrix) 50 MCG/0.5ML SUSR Shingrix (PF) 50 mcg/0.5 mL intramuscular suspension, kit    Historical Provider, MD Arreolampedoic Acid-Ezetimibe (Nexlizet) 180-10 MG TABS Nexlizet 180 mg-10 mg tablet   TAKE ONE (1) TABLET DAILY WITH SUPPER (STOP VASCEPA AND SIMVASTATIN)  Patient not taking: Reported on 7/10/2023  10/9/23  Historical Provider, MD   ciprofloxacin (CIPRO) 500 mg tablet ciprofloxacin 500 mg tablet  Patient not taking: Reported on 2/24/2023  10/9/23  Historical Provider, MD   diazepam (VALIUM) 5 mg tablet diazepam 5 mg tablet  Patient not taking: Reported on 2/24/2023  10/9/23  Historical Provider, MD   Diclofenac Sodium (VOLTAREN) 1 % diclofenac 1 % topical gel  Patient not taking: Reported on 2/24/2023  10/9/23  Historical Provider, MD   Fenofibrate Micronized 90 MG CAPS Antara 90 mg capsule  Patient not taking: Reported on 2/24/2023  10/9/23  Historical Provider, MD   fluconazole (DIFLUCAN) 150 mg tablet fluconazole 150 mg tablet  Patient not taking: Reported on 2/24/2023  10/9/23  Historical Provider, MD   Icosapent Ethyl 1 g CAPS Take by mouth    Patient not taking: Reported on 2/24/2023  10/9/23  Historical Provider, MD   Mirabegron ER (Myrbetriq) 50 MG TB24 Take by mouth  Patient not taking: Reported on 2/24/2023  10/9/23  Historical Provider, MD   oxyCODONE (ROXICODONE) 5 immediate release tablet oxycodone 5 mg tablet  Patient not taking: Reported on 2/24/2023  10/9/23  Historical Provider, MD   polyethylene glycol (MIRALAX) 17 g packet Take by mouth  Patient not taking: Reported on 12/9/2021  10/9/23  Historical Provider, MD   pregabalin (LYRICA) 75 mg capsule Lyrica 75 mg capsule  Patient not taking: Reported on 2/24/2023  10/9/23  Historical Provider, MD   vilazodone (VIIBRYD) 20 mg tablet Take 20 mg by mouth  Patient not taking: Reported on 2/24/2023  10/9/23  Historical Provider, MD     I have reviewed home medications with patient personally. Allergies:    Allergies   Allergen Reactions   • Niacin Other (See Comments)   • Risedronate Other (See Comments)   • Sulfa Antibiotics Swelling   • Sulfamethoxazole-Trimethoprim Swelling       Social History:  Marital Status: /Civil Union     Patient Pre-hospital Diet Restrictions: n/a  Substance Use History:   Social History Substance and Sexual Activity   Alcohol Use Never     Social History     Tobacco Use   Smoking Status Never   Smokeless Tobacco Never     Social History     Substance and Sexual Activity   Drug Use Never       Family History:  History reviewed. No pertinent family history. Physical Exam:     Vitals:   Blood Pressure: 158/90 (10/09/23 1500)  Pulse: 77 (10/09/23 1500)  Temperature: (!) 97.4 °F (36.3 °C) (10/09/23 0950)  Respirations: 16 (10/09/23 1200)  SpO2: 97 % (10/09/23 1517)    Physical Exam  Vitals reviewed. Constitutional:       General: She is not in acute distress. Appearance: She is well-developed. She is not diaphoretic. HENT:      Head: Normocephalic and atraumatic. Nose: Nose normal.      Mouth/Throat:      Pharynx: No oropharyngeal exudate. Eyes:      General: No scleral icterus. Right eye: No discharge. Left eye: No discharge. Conjunctiva/sclera: Conjunctivae normal.   Cardiovascular:      Rate and Rhythm: Normal rate and regular rhythm. Heart sounds: Normal heart sounds. No murmur heard. No friction rub. No gallop. Pulmonary:      Effort: Pulmonary effort is normal. No respiratory distress. Breath sounds: Normal breath sounds. No wheezing or rales. Abdominal:      General: Bowel sounds are normal. There is distension. Palpations: Abdomen is soft. Tenderness: There is abdominal tenderness. There is no guarding or rebound. Musculoskeletal:         General: Normal range of motion. Cervical back: Normal range of motion and neck supple. Skin:     General: Skin is warm and dry. Findings: No erythema. Neurological:      Mental Status: She is alert and oriented to person, place, and time.    Psychiatric:         Behavior: Behavior normal.          Additional Data:     Lab Results:  Results from last 7 days   Lab Units 10/09/23  1001   WBC Thousand/uL 10.15   HEMOGLOBIN g/dL 13.9   HEMATOCRIT % 41.7   PLATELETS Thousands/uL 303 NEUTROS PCT % 44   LYMPHS PCT % 47*   MONOS PCT % 6   EOS PCT % 2     Results from last 7 days   Lab Units 10/09/23  1001   SODIUM mmol/L 137   POTASSIUM mmol/L 4.4   CHLORIDE mmol/L 102   CO2 mmol/L 25   BUN mg/dL 11   CREATININE mg/dL 0.98   ANION GAP mmol/L 10   CALCIUM mg/dL 9.6   ALBUMIN g/dL 4.3   TOTAL BILIRUBIN mg/dL 0.75   ALK PHOS U/L 68   ALT U/L 14   AST U/L 24   GLUCOSE RANDOM mg/dL 146*                       Lines/Drains:  Invasive Devices     Peripheral Intravenous Line  Duration           Peripheral IV 10/09/23 Right Antecubital <1 day                    Imaging: Reviewed radiology reports from this admission including: abdominal/pelvic CT  CT abdomen pelvis with contrast   Final Result by Lane Griffin MD (10/09 1152)      Redemonstration of extensive perianal subcutaneous emphysema now extending along the anterior abdominal wall and into the retroperitoneal space. Workstation performed: [id number]             EKG and Other Studies Reviewed on Admission:   · EKG: NSR. HR 84.    ** Please Note: This note has been constructed using a voice recognition system.  **

## 2023-10-09 NOTE — CONSULTS
Consultation - [facility]  Jordan Navarro 80 y.o. female MRN: [id number]  Unit/Bed#: ED 29 Encounter: [id number]         Reason for Consult / Principal Problem: Rectal trauma post CT colonography     HPI: Aylin Gan is an 80year old female with history of colon polyps and previously documented bladder cancer. Patient with history of alternating diarrhea and constipation with rectal bleeding despite Anusol suppository and cream course since February. Patient also advised to continue on a bowel regimen. She had later reported family history of colon cancer in her son who passed away at age 77. She presented to the ED in August for rectal bleeding x 2 days, and CT then without acute abdominal pathology. Incidental findings of pulmonary nodule and small renal hypodensities with a left renal lesion measuring 2.3 cm. She underwent CT colonoscopy this morning, and was transported from the CT department due to suspicion for rectal trauma during enema administration. CT with IV contrast repeated after CT colonography revealed subcutaneous emphysema in the perianal fossa extending anteriorly. "No obvious defect" noted in report. CMP and CBC WNL thus far. Initial blood pressure of 99/58 improved with IVF. Hemodynamically stable in the ED. Patient reports her abdominal pain is controlled currently. Her significant other and daughter in law are at the bedside. Her last colonoscopy was in 2016.  She has a personal history of colon polyps. Internal hemorrhoids were noted at the time. Her son passed away from colon cancer at age 77, per patient's daughter in law. Review of Systems:    CONSTITUTIONAL: Denies any fever, chills, or rigors. HEENT: No earache or tinnitus. Denies hearing loss or visual disturbances. CARDIOVASCULAR: No chest pain or palpitations. RESPIRATORY: Denies any cough, hemoptysis, shortness of breath or dyspnea on exertion.   GASTROINTESTINAL: As noted in the History of Present Illness. GENITOURINARY: No problems with urination. Denies any hematuria or dysuria. NEUROLOGIC: No dizziness or vertigo, denies headaches. MUSCULOSKELETAL: Denies any muscle or joint pain. SKIN: Denies skin rashes or itching. ENDOCRINE: Denies excessive thirst. Denies intolerance to heat or cold. PSYCHOSOCIAL: Denies depression or anxiety. Denies any recent memory loss. Historical Information   Past Medical History:   Diagnosis Date   • GI bleed    • High cholesterol    • Migraines      Past Surgical History:   Procedure Laterality Date   • BACK SURGERY     • COLONOSCOPY W/ POLYPECTOMY     • HYSTERECTOMY W/ SALPINGO-OOPHERECTOMY       Social History   Social History     Substance and Sexual Activity   Alcohol Use Never     Social History     Substance and Sexual Activity   Drug Use Never     Social History     Tobacco Use   Smoking Status Never   Smokeless Tobacco Never     History reviewed. No pertinent family history. Meds/Allergies     Current Facility-Administered Medications   Medication Dose Route Frequency   • piperacillin-tazobactam (ZOSYN) 3.375 g in sodium chloride 0.9 % 100 mL IVPB  3.375 g Intravenous Once       Allergies   Allergen Reactions   • Niacin Other (See Comments)   • Risedronate Other (See Comments)   • Sulfa Antibiotics Swelling   • Sulfamethoxazole-Trimethoprim Swelling         Objective     Blood pressure 139/65, pulse 67, temperature (!) 97.4 °F (36.3 °C), resp. rate 16, SpO2 98 %, not currently breastfeeding. Intake/Output Summary (Last 24 hours) at 10/9/2023 1321  Last data filed at 10/9/2023 1105  Gross per 24 hour   Intake 1000 ml   Output --   Net 1000 ml         PHYSICAL EXAM:      General Appearance:   Alert and oriented x 3. Cooperative, and in no respiratory distress   HEENT:   Normocephalic, atraumatic, anicteric.      Neck:  Supple, symmetrical, trachea midline   Lungs:   Clear to auscultation bilaterally; no rales, rhonchi or wheezing; respirations unlabored    Heart[de-identified]   S1 and S2 normal; regular rate and rhythm; no murmur, rub, or gallop. Abdomen:   Soft, non-tender, moderately distended; hyperactive bowel sounds; no masses, no organomegaly    Genitalia:   Deferred    Rectal:   Deferred    Extremities:  No cyanosis, clubbing or edema    Pulses:  2+ and symmetric all extremities    Skin:  Skin color, texture, turgor normal, no rashes or lesions    Lymph nodes:  No palpable cervical or supraclavicular lymphadenopathy        Lab Results:   Results from last 7 days   Lab Units 10/09/23  1001   WBC Thousand/uL 10.15   HEMOGLOBIN g/dL 13.9   HEMATOCRIT % 41.7   PLATELETS Thousands/uL 303   NEUTROS PCT % 44   LYMPHS PCT % 47*   MONOS PCT % 6   EOS PCT % 2     Results from last 7 days   Lab Units 10/09/23  1001   POTASSIUM mmol/L 4.4   CHLORIDE mmol/L 102   CO2 mmol/L 25   BUN mg/dL 11   CREATININE mg/dL 0.98   CALCIUM mg/dL 9.6   ALK PHOS U/L 68   ALT U/L 14   AST U/L 24               Imaging Studies:  CT abdomen pelvis with contrast    Result Date: 10/9/2023  Impression: Redemonstration of extensive perianal subcutaneous emphysema now extending along the anterior abdominal wall and into the retroperitoneal space. Workstation performed: [id number]     CT colonoscopy diagnostic wo contrast    Addendum Date: 10/9/2023    ADDENDUM: Please note the soft tissue air densities described in the original report involve the ischioanal fossa and extends to the overlying medial gluteal soft tissues. Result Date: 10/9/2023  Impression: 1. Moderate to large amount of air is seen within the soft tissues of the perianal fossa on prone images which was not present on the supine images acquired immediately prior. Findings most compatible with underlying injury/defect involving the wall of the anal canal. There is no intrapelvic free air or pneumoperitoneum. 2. No evidence of colonic polyp of 6 mm or greater in size within the confines of the study.  There is sigmoid diverticulosis with incomplete distention of the sigmoid colon which does limit evaluation. 3. Cystic changes within the midpole of the left kidney which may represent abutting renal cyst versus mildly complex septated cyst. Nonemergent follow-up with renal ultrasound is recommended for further characterization 4. Previously identified pulmonary nodule measuring 7 mm within the right lower lobe is not included within the field-of-view on this exam. Please refer to prior CT abdomen pelvis report of 8/29/2023 for follow-up recommendations. Preliminary findings pertaining to air within the perianal soft tissues was discussed with Dr. Sundar Cobian by my colleague Dr. Romi Sawant shortly after image acquisition. The patient was subsequently escorted to the emergency department. The study was marked in [facility] for immediate notification. Workstation performed: [id number]       ASSESSMENT and PLAN:      1) Suspected rectal trauma with perforation post CT colonography, family history of colon cancer, rectal bleeding, and alternating bowel habits - Patient reports pain is controlled at the time of ED encounter. Fortunately, she is hemodynamically stable without fever or white count. Discussed CT findings with patient, patient's partner and daughter in law. Initial CT did not show "obvious focal defect" but findings compatible for injury/defect in the anal canal.   - Discussed with surgery team. Plan for conservative management with IV antibiotics and close monitoring for now  - If patient has change in hemodynamics, signs of sepsis, or worsening symptoms, will need surgery. Discussed with patient and family at the bedside who voice understanding.   - IV Zosyn initiated   - Serial abdominal exams  - NPO   - Check CBC and CMP closely  - Continue to monitor hemodynamics   - Appreciate surgery recommendations    2) 2.3 cm left renal lesion, pulmonary nodules - Patient and patient's daughter in law at bedside not aware of finding.  Will need to be followed up by a renal ultrasound after above. Again, CT chest as an outpatient as recommended by radiology in 6 months. The patient was seen and examined by Dr. Aleksander Frazier, all [facility] medical decisions were made with Dr. Aleksander Frazier. Thank you for allowing us to participate in the care of this pleasant patient. We will follow up with you closely. Portions of the record may have been created with voice recognition software. Occasional wrong word or "sound a like" substitutions may have occurred due to the inherent limitations of voice recognition software. Read the chart carefully and recognize, using context, where substitutions have occurred.

## 2023-10-09 NOTE — ED PROVIDER NOTES
History  Chief Complaint   Patient presents with   • Evaluation of Abnormal Diagnostic Test     Abnormal CT colonoscopy done. Patient is an 59-year-old female with a past medical history of hypercholesterolemia and GI bleed presented to the emergency department for evaluation of abnormal CT colonoscopy. Patient was scheduled for a CT colonoscopy today by GI for episodes of constipation and concern with family history of colon cancer. Patient was brought over from CT scan for possible rectal tear during contrast enema. Patient does report having midline abdominal cramping. Patient has no other concerns or complaints at this time. Prior to Admission Medications   Prescriptions Last Dose Informant Patient Reported? Taking?    Docusate Sodium (COLACE PO)  Self Yes No   Sig: Take by mouth   Zoster Vac Recomb Adjuvanted (Shingrix) 50 MCG/0.5ML SUSR  Self Yes No   Sig: Shingrix (PF) 50 mcg/0.5 mL intramuscular suspension, kit   acetaminophen (TYLENOL) 500 mg tablet  Self Yes No   Sig: Take 500 mg by mouth every 6 (six) hours as needed for mild pain   acetaminophen-codeine (TYLENOL with CODEINE #3) 300-30 MG per tablet  Self Yes No   Sig: Take 1-2 tablets by mouth every 4 (four) hours as needed   aspirin (ECOTRIN LOW STRENGTH) 81 mg EC tablet  Self Yes No   Sig: every 24 hours   hydrocortisone (ANUSOL-HC) 2.5 % rectal cream  Self No No   Sig: Apply topically 2 (two) times a day   hydrocortisone (ANUSOL-HC) 25 mg suppository  Self No No   Sig: Insert 1 suppository (25 mg total) into the rectum 2 (two) times a day   ketoconazole (NIZORAL) 2 % cream  Self Yes No   lactulose (CHRONULAC) 10 g/15 mL solution  Self Yes No   Sig: lactulose 10 gram/15 mL oral solution   naproxen sodium (ALEVE) 220 MG tablet  Self Yes No   Sig: Take by mouth   polyethylene glycol (MIRALAX) 17 g packet  Self Yes No   Sig: Take by mouth   simvastatin (ZOCOR) 10 mg tablet  Self Yes No   Sig: TAKE ONE TABLET WITH SUPPER   Patient not taking: Reported on 2/24/2023   vilazodone (VIIBRYD) 10 mg tablet  Self Yes No   Sig: Viibryd 10 mg tablet      Facility-Administered Medications: None       Past Medical History:   Diagnosis Date   • GI bleed    • High cholesterol    • Migraines        Past Surgical History:   Procedure Laterality Date   • BACK SURGERY     • COLONOSCOPY W/ POLYPECTOMY     • HYSTERECTOMY W/ SALPINGO-OOPHERECTOMY         History reviewed. No pertinent family history. I have reviewed and agree with the history as documented. E-Cigarette/Vaping   • E-Cigarette Use Never User      E-Cigarette/Vaping Substances     Social History     Tobacco Use   • Smoking status: Never   • Smokeless tobacco: Never   Vaping Use   • Vaping Use: Never used   Substance Use Topics   • Alcohol use: Never   • Drug use: Never       Review of Systems   Constitutional: Negative for chills and fever. HENT: Negative for ear pain and sore throat. Eyes: Negative for pain and visual disturbance. Respiratory: Negative for cough and shortness of breath. Cardiovascular: Negative for chest pain and palpitations. Gastrointestinal: Positive for abdominal pain. Negative for vomiting. Genitourinary: Negative for dysuria and hematuria. Musculoskeletal: Negative for arthralgias and back pain. Skin: Negative for color change and rash. Neurological: Negative for seizures and syncope. All other systems reviewed and are negative. Physical Exam  Physical Exam  Vitals and nursing note reviewed. Constitutional:       General: She is not in acute distress. Appearance: Normal appearance. She is well-developed. She is not toxic-appearing or diaphoretic. HENT:      Head: Normocephalic and atraumatic. Right Ear: External ear normal.      Left Ear: External ear normal.      Nose: Nose normal.      Mouth/Throat:      Mouth: Mucous membranes are moist.   Eyes:      General: No scleral icterus. Right eye: No discharge.          Left eye: No discharge. Conjunctiva/sclera: Conjunctivae normal.   Cardiovascular:      Rate and Rhythm: Normal rate and regular rhythm. Heart sounds: No murmur heard. Pulmonary:      Effort: Pulmonary effort is normal. No respiratory distress. Breath sounds: Normal breath sounds. Abdominal:      General: There is distension. Palpations: Abdomen is soft. Tenderness: There is abdominal tenderness. Musculoskeletal:         General: No swelling, deformity or signs of injury. Normal range of motion. Cervical back: Normal range of motion and neck supple. No rigidity. Skin:     General: Skin is warm and dry. Capillary Refill: Capillary refill takes less than 2 seconds. Coloration: Skin is not jaundiced. Findings: No erythema or rash. Neurological:      General: No focal deficit present. Mental Status: She is alert and oriented to person, place, and time. Mental status is at baseline. Cranial Nerves: No cranial nerve deficit. Gait: Gait normal.   Psychiatric:         Mood and Affect: Mood normal.         Behavior: Behavior normal.         Thought Content:  Thought content normal.         Judgment: Judgment normal.         Vital Signs  ED Triage Vitals   Temperature Pulse Respirations Blood Pressure SpO2   10/09/23 0950 10/09/23 0950 10/09/23 0950 10/09/23 0950 10/09/23 0950   (!) 97.4 °F (36.3 °C) 71 16 99/58 99 %      Temp src Heart Rate Source Patient Position - Orthostatic VS BP Location FiO2 (%)   -- 10/09/23 1130 10/09/23 1200 10/09/23 1200 --    Monitor Lying Right arm       Pain Score       10/09/23 1002       8           Vitals:    10/09/23 0950 10/09/23 1130 10/09/23 1200   BP: 99/58 142/65 139/65   Pulse: 71 71 67   Patient Position - Orthostatic VS:   Lying         Visual Acuity      ED Medications  Medications   sodium chloride 0.9 % bolus 1,000 mL (0 mL Intravenous Stopped 10/9/23 1105)   fentanyl citrate (PF) 100 MCG/2ML 25 mcg (25 mcg Intravenous Given 10/9/23 1002)   iohexol (OMNIPAQUE) 350 MG/ML injection (MULTI-DOSE) 100 mL (100 mL Intravenous Given 10/9/23 1106)   piperacillin-tazobactam (ZOSYN) 3.375 g in sodium chloride 0.9 % 100 mL IVPB (0 g Intravenous Stopped 10/9/23 1402)       Diagnostic Studies  Results Reviewed     Procedure Component Value Units Date/Time    Comprehensive metabolic panel [[id number]]  (Abnormal) Collected: 10/09/23 1001    Lab Status: Final result Specimen: Blood from Arm, Right Updated: 10/09/23 1042     Sodium 137 mmol/L      Potassium 4.4 mmol/L      Chloride 102 mmol/L      CO2 25 mmol/L      ANION GAP 10 mmol/L      BUN 11 mg/dL      Creatinine 0.98 mg/dL      Glucose 146 mg/dL      Calcium 9.6 mg/dL      AST 24 U/L      ALT 14 U/L      Alkaline Phosphatase 68 U/L      Total Protein 7.8 g/dL      Albumin 4.3 g/dL      Total Bilirubin 0.75 mg/dL      eGFR 51 ml/min/1.73sq m     Narrative:      [place] guidelines for Chronic Kidney Disease (CKD):   •  Stage 1 with normal or high GFR (GFR > 90 mL/min/1.73 square meters)  •  Stage 2 Mild CKD (GFR = 60-89 mL/min/1.73 square meters)  •  Stage 3A Moderate CKD (GFR = 45-59 mL/min/1.73 square meters)  •  Stage 3B Moderate CKD (GFR = 30-44 mL/min/1.73 square meters)  •  Stage 4 Severe CKD (GFR = 15-29 mL/min/1.73 square meters)  •  Stage 5 End Stage CKD (GFR <15 mL/min/1.73 square meters)  Note: GFR calculation is accurate only with a steady state creatinine    CBC and differential [[id number]]  (Abnormal) Collected: 10/09/23 1001    Lab Status: Final result Specimen: Blood from Arm, Right Updated: 10/09/23 1024     WBC 10.15 Thousand/uL      RBC 4.68 Million/uL      Hemoglobin 13.9 g/dL      Hematocrit 41.7 %      MCV 89 fL      MCH 29.7 pg      MCHC 33.3 g/dL      RDW 12.8 %      MPV 8.8 fL      Platelets 507 Thousands/uL      nRBC 0 /100 WBCs      Neutrophils Relative 44 %      Immat GRANS % 0 %      Lymphocytes Relative 47 %      Monocytes Relative 6 % Eosinophils Relative 2 %      Basophils Relative 1 %      Neutrophils Absolute 4.51 Thousands/µL      Immature Grans Absolute 0.02 Thousand/uL      Lymphocytes Absolute 4.71 Thousands/µL      Monocytes Absolute 0.56 Thousand/µL      Eosinophils Absolute 0.24 Thousand/µL      Basophils Absolute 0.11 Thousands/µL                  CT abdomen pelvis with contrast   Final Result by Bentley Argueta MD (10/09 1152)      Redemonstration of extensive perianal subcutaneous emphysema now extending along the anterior abdominal wall and into the retroperitoneal space. Workstation performed: [id number]                    Procedures  Procedures         ED Course                 Medical Decision Making    This is a 51-year-old female present to the emergency department for evaluation after an abnormal CT colonoscopy. Patient was sent over by CT for concern of rectal tear during contrast enema. Patient does report having abdominal cramping and bloating. Patient is in no acute distress initial examination, stable vital signs. Differential diagnosis to include but is not limited to: Rectal perforation, free air    Initial ED Plan: labs, imaging  -Reached out to Dr. Chantel Chapman, recommending repeat CT abdomen with IV contrast    ED results:  Redemonstration of extensive perianal subcutaneous emphysema now extending along the anterior abdominal wall and into the retroperitoneal space. Zoya with general surgery, recommending admission, n.p.o. with GI consult IV antibiotics. Final ED assessment: Patient is stable and well appearing. Discussed radiologic studies and laboratory results. Patient verbalized understanding and is agreeable with the plan for admission. Discussed with Dr. Saurav Campuzano, observation, bridging orders placed. Amount and/or Complexity of Data Reviewed  Labs: ordered. Radiology: ordered. Risk  Prescription drug management. Decision regarding hospitalization.           Disposition  Final diagnoses:   Subcutaneous emphysema following procedure, initial encounter     Time reflects when diagnosis was documented in both MDM as applicable and the Disposition within this note     Time User Action Codes Description Comment    10/9/2023 12:43 PM [facility] Add [T81.82XA] Subcutaneous emphysema following procedure, initial encounter       ED Disposition     ED Disposition   Admit    Condition   Stable    Date/Time   Mon Oct 9, 2023 12:42 PM    Comment   Case was discussed with Dr. Sam Spatz and the patient's admission status was agreed to be Admission Status: observation status to the service of Dr. Sam Spatz . Follow-up Information    None         Patient's Medications   Discharge Prescriptions    No medications on file       No discharge procedures on file.     PDMP Review     None          ED Provider  Electronically Signed by           Sheldon Talley PA-C  10/09/23 5475

## 2023-10-09 NOTE — ASSESSMENT & PLAN NOTE
Px after abdominal pain and abnormal findings of CT colonoscopy diagnostic. Concern for rectal perf vs tear after receiving contrast enema. CT showing extensive perianal subcutaneous emphysema now extending along the anterior abdominal wall and into the retroperitoneal space.    · ED discussed cased with surgery who recommended slim admission with GI consultation  · Surgery and GI consulted, appreciate recommendations  · Per surgery continue with conservation management  · Strict NPO   · Pain management  · Serial abdominal exam   · IV zosyn for now   · q6 hours cbc and cmp per GI  · If pt shows signs of infection, may need to consider loop colostomy, will hold DVT ppx for now in case pt requires OR

## 2023-10-09 NOTE — Clinical Note
Case was discussed with Dr. Ruby Barker and the patient's admission status was agreed to be Admission Status: inpatient status to the service of Dr. Ruby Barker .

## 2023-10-09 NOTE — CONSULTS
Consultation - General Surgery  Daren Kirk 80 y.o. female MRN: [id number]  Unit/Bed#: ED 29 Encounter: [id number]                                                  Consult to surgery general  Consult performed by: Aster Riddle PA-C  Consult ordered by: Jacky Shay PA-C        Assessment   Daren Kirk is a 80y.o. year old female with rectal trauma/perforation after CT colonoscopy  Retroperitoneal and subcutaneous gas on CT scan  Constipation  HLD    CT AP w IV contrast:  As on the previous study, there is extensive subcutaneous emphysema in the perianal fossa. The emphysema now extends anteriorly into the peroneal soft tissues, superiorly along the right anterior lateral abdominal wall,   and retroperitoneally in the presacral space and along the right ureter. Plan  Plan to treat conservatively at this time with diet NPO, IVF and IV antibiotics as perforation is nonperitoneal. If patient shows signs of developing infection she may need surgical intervention with loop colostomy. Monitor Vitals and labs  Serial abdominal exams  PRN pain medication and anti-emetics  DVT ppx  Incentive spirometry 10 times/hour while awake    ______________________________________________________________________    CHIEF COMPLAINT: abnormal CT scan    HPI: Daren Kirk is a 80y.o. year old female with PMHx of Constipation. She was seen today for CT colonoscopy with rectal contrast and develop perianal air during imaging prompting further work up. She has had some blood in stool and alternating constipation and diarrhea. She has a son with history of colon cancer so desired work up but do to apprehension of getting anesthesia with colonoscopy, opted for a CT colonoscopy. During the imaging today it was noted that gas was present in the perianal space and she was sent to the ED. She denies abdominal pain or feeling bloated/distended. She denies nausea or vomiting. She is passing flatus.      Review of Systems Constitutional: Negative for activity change, appetite change, chills, fatigue and fever. HENT: Negative for congestion and sore throat. Eyes: Negative. Respiratory: Negative. Negative for cough, shortness of breath and wheezing. Cardiovascular: Negative. Negative for chest pain and palpitations. Gastrointestinal: Negative. Negative for abdominal distention, abdominal pain, nausea and vomiting. Endocrine: Negative. Genitourinary: Negative. Negative for difficulty urinating and dysuria. Musculoskeletal: Negative. Skin: Negative. Negative for color change and wound. Allergic/Immunologic: Negative. Neurological: Negative. Negative for dizziness and headaches. Hematological: Negative. Psychiatric/Behavioral: Negative. All other systems reviewed and are negative. Meds/Allergies   Allergies   Allergen Reactions   • Niacin Other (See Comments)   • Risedronate Other (See Comments)   • Sulfa Antibiotics Swelling   • Sulfamethoxazole-Trimethoprim Swelling      all current active meds have been reviewed       Historical Information   Past Medical History:   Diagnosis Date   • GI bleed    • High cholesterol    • Migraines      Past Surgical History:   Procedure Laterality Date   • BACK SURGERY     • COLONOSCOPY W/ POLYPECTOMY     • HYSTERECTOMY W/ SALPINGO-OOPHERECTOMY       Social History   Social History     Substance and Sexual Activity   Alcohol Use Never     Social History     Substance and Sexual Activity   Drug Use Never     Social History     Tobacco Use   Smoking Status Never   Smokeless Tobacco Never       Family History: History reviewed. No pertinent family history.       Objective   Lab Results:   Recent Results (from the past 36 hour(s))   CBC and differential    Collection Time: 10/09/23 10:01 AM   Result Value Ref Range    WBC 10.15 4.31 - 10.16 Thousand/uL    RBC 4.68 3.81 - 5.12 Million/uL    Hemoglobin 13.9 11.5 - 15.4 g/dL    Hematocrit 41.7 34.8 - 46.1 %    MCV 89 82 - 98 fL    MCH 29.7 26.8 - 34.3 pg    MCHC 33.3 31.4 - 37.4 g/dL    RDW 12.8 11.6 - 15.1 %    MPV 8.8 (L) 8.9 - 12.7 fL    Platelets 465 046 - 199 Thousands/uL    nRBC 0 /100 WBCs    Neutrophils Relative 44 43 - 75 %    Immat GRANS % 0 0 - 2 %    Lymphocytes Relative 47 (H) 14 - 44 %    Monocytes Relative 6 4 - 12 %    Eosinophils Relative 2 0 - 6 %    Basophils Relative 1 0 - 1 %    Neutrophils Absolute 4.51 1.85 - 7.62 Thousands/µL    Immature Grans Absolute 0.02 0.00 - 0.20 Thousand/uL    Lymphocytes Absolute 4.71 (H) 0.60 - 4.47 Thousands/µL    Monocytes Absolute 0.56 0.17 - 1.22 Thousand/µL    Eosinophils Absolute 0.24 0.00 - 0.61 Thousand/µL    Basophils Absolute 0.11 (H) 0.00 - 0.10 Thousands/µL   Comprehensive metabolic panel    Collection Time: 10/09/23 10:01 AM   Result Value Ref Range    Sodium 137 135 - 147 mmol/L    Potassium 4.4 3.5 - 5.3 mmol/L    Chloride 102 96 - 108 mmol/L    CO2 25 21 - 32 mmol/L    ANION GAP 10 mmol/L    BUN 11 5 - 25 mg/dL    Creatinine 0.98 0.60 - 1.30 mg/dL    Glucose 146 (H) 65 - 140 mg/dL    Calcium 9.6 8.4 - 10.2 mg/dL    AST 24 13 - 39 U/L    ALT 14 7 - 52 U/L    Alkaline Phosphatase 68 34 - 104 U/L    Total Protein 7.8 6.4 - 8.4 g/dL    Albumin 4.3 3.5 - 5.0 g/dL    Total Bilirubin 0.75 0.20 - 1.00 mg/dL    eGFR 51 ml/min/1.73sq m         Intake/Output Summary (Last 24 hours) at 10/9/2023 1314  Last data filed at 10/9/2023 1105  Gross per 24 hour   Intake 1000 ml   Output --   Net 1000 ml     Invasive Devices     Peripheral Intravenous Line  Duration           Peripheral IV 10/09/23 Right Antecubital <1 day                Physical Exam  Vitals: /65 (BP Location: Right arm)   Pulse 67   Temp (!) 97.4 °F (36.3 °C)   Resp 16   SpO2 98%   GEN: A & O x 3, cooperative, No acute distress, appears stated age  HEENT: pupils equal and round, EOMI, sclera anicterus, oral mucosa pink/moist  NECK: supple  LUNGS: clear throughout, no wheezes or rhonchi  COR: RRR, S1 and S2 normal  ABD: +BS, abdomen distended but soft and nontender, no subcutaneous crepitus appreciated,  no guarding, no rigidity,   EXTREM: FROM no joint deformities. mild edema  SKIN: warm, dry, no rash, no jaundice  NEURO: CN II -XII intact, no tremor, affect appropriate    Imaging Studies:   CT abdomen pelvis with contrast    Result Date: 10/9/2023  Impression: Redemonstration of extensive perianal subcutaneous emphysema now extending along the anterior abdominal wall and into the retroperitoneal space. Workstation performed: [id number]     CT colonoscopy diagnostic wo contrast    Addendum Date: 10/9/2023    ADDENDUM: Please note the soft tissue air densities described in the original report involve the ischioanal fossa and extends to the overlying medial gluteal soft tissues. Result Date: 10/9/2023  Impression: 1. Moderate to large amount of air is seen within the soft tissues of the perianal fossa on prone images which was not present on the supine images acquired immediately prior. Findings most compatible with underlying injury/defect involving the wall of the anal canal. There is no intrapelvic free air or pneumoperitoneum. 2. No evidence of colonic polyp of 6 mm or greater in size within the confines of the study. There is sigmoid diverticulosis with incomplete distention of the sigmoid colon which does limit evaluation. 3. Cystic changes within the midpole of the left kidney which may represent abutting renal cyst versus mildly complex septated cyst. Nonemergent follow-up with renal ultrasound is recommended for further characterization 4. Previously identified pulmonary nodule measuring 7 mm within the right lower lobe is not included within the field-of-view on this exam. Please refer to prior CT abdomen pelvis report of 8/29/2023 for follow-up recommendations.  Preliminary findings pertaining to air within the perianal soft tissues was discussed with Dr. Nnamdi Turner by my colleague Dr. Kristen Narvaez shortly after image acquisition. The patient was subsequently escorted to the emergency department. The study was marked in [facility] for immediate notification.  Workstation performed: Gumaro Sal PA-C  10/9/2023

## 2023-10-10 LAB
ALBUMIN SERPL BCP-MCNC: 3.7 G/DL (ref 3.5–5)
ALBUMIN SERPL BCP-MCNC: 3.8 G/DL (ref 3.5–5)
ALP SERPL-CCNC: 58 U/L (ref 34–104)
ALP SERPL-CCNC: 62 U/L (ref 34–104)
ALT SERPL W P-5'-P-CCNC: 12 U/L (ref 7–52)
ALT SERPL W P-5'-P-CCNC: 12 U/L (ref 7–52)
ANION GAP SERPL CALCULATED.3IONS-SCNC: 7 MMOL/L
ANION GAP SERPL CALCULATED.3IONS-SCNC: 9 MMOL/L
AST SERPL W P-5'-P-CCNC: 23 U/L (ref 13–39)
AST SERPL W P-5'-P-CCNC: 23 U/L (ref 13–39)
BASOPHILS # BLD AUTO: 0.06 THOUSANDS/ÂΜL (ref 0–0.1)
BASOPHILS # BLD AUTO: 0.07 THOUSANDS/ÂΜL (ref 0–0.1)
BASOPHILS NFR BLD AUTO: 1 % (ref 0–1)
BASOPHILS NFR BLD AUTO: 1 % (ref 0–1)
BILIRUB SERPL-MCNC: 0.94 MG/DL (ref 0.2–1)
BILIRUB SERPL-MCNC: 1.09 MG/DL (ref 0.2–1)
BUN SERPL-MCNC: 10 MG/DL (ref 5–25)
BUN SERPL-MCNC: 10 MG/DL (ref 5–25)
CALCIUM SERPL-MCNC: 8.8 MG/DL (ref 8.4–10.2)
CALCIUM SERPL-MCNC: 9 MG/DL (ref 8.4–10.2)
CHLORIDE SERPL-SCNC: 105 MMOL/L (ref 96–108)
CHLORIDE SERPL-SCNC: 105 MMOL/L (ref 96–108)
CO2 SERPL-SCNC: 24 MMOL/L (ref 21–32)
CO2 SERPL-SCNC: 26 MMOL/L (ref 21–32)
CREAT SERPL-MCNC: 0.94 MG/DL (ref 0.6–1.3)
CREAT SERPL-MCNC: 0.99 MG/DL (ref 0.6–1.3)
EOSINOPHIL # BLD AUTO: 0.24 THOUSAND/ÂΜL (ref 0–0.61)
EOSINOPHIL # BLD AUTO: 0.36 THOUSAND/ÂΜL (ref 0–0.61)
EOSINOPHIL NFR BLD AUTO: 2 % (ref 0–6)
EOSINOPHIL NFR BLD AUTO: 4 % (ref 0–6)
ERYTHROCYTE [DISTWIDTH] IN BLOOD BY AUTOMATED COUNT: 12.7 % (ref 11.6–15.1)
ERYTHROCYTE [DISTWIDTH] IN BLOOD BY AUTOMATED COUNT: 12.7 % (ref 11.6–15.1)
GFR SERPL CREATININE-BSD FRML MDRD: 51 ML/MIN/1.73SQ M
GFR SERPL CREATININE-BSD FRML MDRD: 54 ML/MIN/1.73SQ M
GLUCOSE P FAST SERPL-MCNC: 124 MG/DL (ref 65–99)
GLUCOSE P FAST SERPL-MCNC: 127 MG/DL (ref 65–99)
GLUCOSE SERPL-MCNC: 124 MG/DL (ref 65–140)
GLUCOSE SERPL-MCNC: 127 MG/DL (ref 65–140)
HCT VFR BLD AUTO: 38.9 % (ref 34.8–46.1)
HCT VFR BLD AUTO: 40.6 % (ref 34.8–46.1)
HGB BLD-MCNC: 12.9 G/DL (ref 11.5–15.4)
HGB BLD-MCNC: 13.4 G/DL (ref 11.5–15.4)
IMM GRANULOCYTES # BLD AUTO: 0.02 THOUSAND/UL (ref 0–0.2)
IMM GRANULOCYTES # BLD AUTO: 0.02 THOUSAND/UL (ref 0–0.2)
IMM GRANULOCYTES NFR BLD AUTO: 0 % (ref 0–2)
IMM GRANULOCYTES NFR BLD AUTO: 0 % (ref 0–2)
LYMPHOCYTES # BLD AUTO: 3.07 THOUSANDS/ÂΜL (ref 0.6–4.47)
LYMPHOCYTES # BLD AUTO: 3.62 THOUSANDS/ÂΜL (ref 0.6–4.47)
LYMPHOCYTES NFR BLD AUTO: 35 % (ref 14–44)
LYMPHOCYTES NFR BLD AUTO: 35 % (ref 14–44)
MCH RBC QN AUTO: 29.6 PG (ref 26.8–34.3)
MCH RBC QN AUTO: 29.9 PG (ref 26.8–34.3)
MCHC RBC AUTO-ENTMCNC: 33 G/DL (ref 31.4–37.4)
MCHC RBC AUTO-ENTMCNC: 33.2 G/DL (ref 31.4–37.4)
MCV RBC AUTO: 90 FL (ref 82–98)
MCV RBC AUTO: 90 FL (ref 82–98)
MONOCYTES # BLD AUTO: 0.75 THOUSAND/ÂΜL (ref 0.17–1.22)
MONOCYTES # BLD AUTO: 0.79 THOUSAND/ÂΜL (ref 0.17–1.22)
MONOCYTES NFR BLD AUTO: 7 % (ref 4–12)
MONOCYTES NFR BLD AUTO: 9 % (ref 4–12)
NEUTROPHILS # BLD AUTO: 4.57 THOUSANDS/ÂΜL (ref 1.85–7.62)
NEUTROPHILS # BLD AUTO: 5.69 THOUSANDS/ÂΜL (ref 1.85–7.62)
NEUTS SEG NFR BLD AUTO: 51 % (ref 43–75)
NEUTS SEG NFR BLD AUTO: 55 % (ref 43–75)
NRBC BLD AUTO-RTO: 0 /100 WBCS
NRBC BLD AUTO-RTO: 0 /100 WBCS
PLATELET # BLD AUTO: 252 THOUSANDS/UL (ref 149–390)
PLATELET # BLD AUTO: 266 THOUSANDS/UL (ref 149–390)
PMV BLD AUTO: 8.4 FL (ref 8.9–12.7)
PMV BLD AUTO: 8.6 FL (ref 8.9–12.7)
POTASSIUM SERPL-SCNC: 3.8 MMOL/L (ref 3.5–5.3)
POTASSIUM SERPL-SCNC: 3.9 MMOL/L (ref 3.5–5.3)
PROT SERPL-MCNC: 7 G/DL (ref 6.4–8.4)
PROT SERPL-MCNC: 7.1 G/DL (ref 6.4–8.4)
RBC # BLD AUTO: 4.31 MILLION/UL (ref 3.81–5.12)
RBC # BLD AUTO: 4.52 MILLION/UL (ref 3.81–5.12)
SODIUM SERPL-SCNC: 138 MMOL/L (ref 135–147)
SODIUM SERPL-SCNC: 138 MMOL/L (ref 135–147)
WBC # BLD AUTO: 10.39 THOUSAND/UL (ref 4.31–10.16)
WBC # BLD AUTO: 8.87 THOUSAND/UL (ref 4.31–10.16)

## 2023-10-10 PROCEDURE — 99233 SBSQ HOSP IP/OBS HIGH 50: CPT | Performed by: INTERNAL MEDICINE

## 2023-10-10 PROCEDURE — 85025 COMPLETE CBC W/AUTO DIFF WBC: CPT | Performed by: STUDENT IN AN ORGANIZED HEALTH CARE EDUCATION/TRAINING PROGRAM

## 2023-10-10 PROCEDURE — 80053 COMPREHEN METABOLIC PANEL: CPT | Performed by: STUDENT IN AN ORGANIZED HEALTH CARE EDUCATION/TRAINING PROGRAM

## 2023-10-10 PROCEDURE — 99232 SBSQ HOSP IP/OBS MODERATE 35: CPT | Performed by: STUDENT IN AN ORGANIZED HEALTH CARE EDUCATION/TRAINING PROGRAM

## 2023-10-10 RX ADMIN — PIPERACILLIN AND TAZOBACTAM 3.38 G: 36; 4.5 INJECTION, POWDER, FOR SOLUTION INTRAVENOUS at 02:41

## 2023-10-10 RX ADMIN — PIPERACILLIN AND TAZOBACTAM 3.38 G: 36; 4.5 INJECTION, POWDER, FOR SOLUTION INTRAVENOUS at 21:32

## 2023-10-10 RX ADMIN — PIPERACILLIN AND TAZOBACTAM 3.38 G: 36; 4.5 INJECTION, POWDER, FOR SOLUTION INTRAVENOUS at 07:43

## 2023-10-10 RX ADMIN — PIPERACILLIN AND TAZOBACTAM 3.38 G: 36; 4.5 INJECTION, POWDER, FOR SOLUTION INTRAVENOUS at 13:32

## 2023-10-10 NOTE — PLAN OF CARE
Problem: PAIN - ADULT  Goal: Verbalizes/displays adequate comfort level or baseline comfort level  Description: Interventions:  - Encourage patient to monitor pain and request assistance  - Assess pain using appropriate pain scale  - Administer analgesics based on type and severity of pain and evaluate response  - Implement non-pharmacological measures as appropriate and evaluate response  - Consider cultural and social influences on pain and pain management  - Notify physician/advanced practitioner if interventions unsuccessful or patient reports new pain  Outcome: Progressing     Problem: INFECTION - ADULT  Goal: Absence or prevention of progression during hospitalization  Description: INTERVENTIONS:  - Assess and monitor for signs and symptoms of infection  - Monitor lab/diagnostic results  - Monitor all insertion sites, i.e. indwelling lines, tubes, and drains  - Monitor endotracheal if appropriate and nasal secretions for changes in amount and color  - [place] appropriate cooling/warming therapies per order  - Administer medications as ordered  - Instruct and encourage patient and family to use good hand hygiene technique  - Identify and instruct in appropriate isolation precautions for identified infection/condition  Outcome: Progressing  Goal: Absence of fever/infection during neutropenic period  Description: INTERVENTIONS:  - Monitor WBC    Outcome: Progressing     Problem: SAFETY ADULT  Goal: Patient will remain free of falls  Description: INTERVENTIONS:  - Educate patient/family on patient safety including physical limitations  - Instruct patient to call for assistance with activity   - Consult OT/PT to assist with strengthening/mobility   - Keep Call bell within reach  - Keep bed low and locked with side rails adjusted as appropriate  - Keep care items and personal belongings within reach  - Initiate and maintain comfort rounds  - Make Fall Risk Sign visible to staff  - Offer Toileting every  Hours, in advance of need  - Initiate/Maintain alarm  - Obtain necessary fall risk management equipment:   - Apply yellow socks and bracelet for high fall risk patients  - Consider moving patient to room near nurses station  Outcome: Progressing  Goal: Maintain or return to baseline ADL function  Description: INTERVENTIONS:  -  Assess patient's ability to carry out ADLs; assess patient's baseline for ADL function and identify physical deficits which impact ability to perform ADLs (bathing, care of mouth/teeth, toileting, grooming, dressing, etc.)  - Assess/evaluate cause of self-care deficits   - Assess range of motion  - Assess patient's mobility; develop plan if impaired  - Assess patient's need for assistive devices and provide as appropriate  - Encourage maximum independence but intervene and supervise when necessary  - Involve family in performance of ADLs  - Assess for home care needs following discharge   - Consider OT consult to assist with ADL evaluation and planning for discharge  - Provide patient education as appropriate  Outcome: Progressing  Goal: Maintains/Returns to pre admission functional level  Description: INTERVENTIONS:  - Perform BMAT or MOVE assessment daily.   - Set and communicate daily mobility goal to care team and patient/family/caregiver. - Collaborate with rehabilitation services on mobility goals if consulted  - Perform Range of Motion  times a day. - Reposition patient every  hours.   - Dangle patient  times a day  - Stand patient  times a day  - Ambulate patient  times a day  - Out of bed to chair  times a day   - Out of bed for meals  times a day  - Out of bed for toileting  - Record patient progress and toleration of activity level   Outcome: Progressing     Problem: DISCHARGE PLANNING  Goal: Discharge to home or other facility with appropriate resources  Description: INTERVENTIONS:  - Identify barriers to discharge w/patient and caregiver  - Arrange for needed discharge resources and transportation as appropriate  - Identify discharge learning needs (meds, wound care, etc.)  - Arrange for interpretive services to assist at discharge as needed  - Refer to Case Management Department for coordinating discharge planning if the patient needs post-hospital services based on physician/advanced practitioner order or complex needs related to functional status, cognitive ability, or social support system  Outcome: Progressing     Problem: Knowledge Deficit  Goal: Patient/family/caregiver demonstrates understanding of disease process, treatment plan, medications, and discharge instructions  Description: Complete learning assessment and assess knowledge base.   Interventions:  - Provide teaching at level of understanding  - Provide teaching via preferred learning methods  Outcome: Progressing

## 2023-10-10 NOTE — ASSESSMENT & PLAN NOTE
Px after abdominal pain and abnormal findings of CT colonoscopy diagnostic.  Concern for rectal perf vs tear after receiving contrast enema  CT showing extensive perianal subcutaneous emphysema now extending along the anterior abdominal wall and into the retroperitoneal space    · ED discussed cased with surgery who recommended slim admission with GI consultation  · Surgery and GI reccs appreciated  · Per surgery continue with conservation management  · NPO; ADAT  · Pain management  · Serial abdominal exam  · IV zosyn  · q6 hours cbc and cmp per GI  · If pt shows signs of infection, may need to consider loop colostomy

## 2023-10-10 NOTE — PLAN OF CARE
Problem: PAIN - ADULT  Goal: Verbalizes/displays adequate comfort level or baseline comfort level  Description: Interventions:  - Encourage patient to monitor pain and request assistance  - Assess pain using appropriate pain scale  - Administer analgesics based on type and severity of pain and evaluate response  - Implement non-pharmacological measures as appropriate and evaluate response  - Consider cultural and social influences on pain and pain management  - Notify physician/advanced practitioner if interventions unsuccessful or patient reports new pain  Outcome: Progressing     Problem: INFECTION - ADULT  Goal: Absence or prevention of progression during hospitalization  Description: INTERVENTIONS:  - Assess and monitor for signs and symptoms of infection  - Monitor lab/diagnostic results  - Monitor all insertion sites, i.e. indwelling lines, tubes, and drains  - Monitor endotracheal if appropriate and nasal secretions for changes in amount and color  - [place] appropriate cooling/warming therapies per order  - Administer medications as ordered  - Instruct and encourage patient and family to use good hand hygiene technique  - Identify and instruct in appropriate isolation precautions for identified infection/condition  Outcome: Progressing  Goal: Absence of fever/infection during neutropenic period  Description: INTERVENTIONS:  - Monitor WBC    Outcome: Progressing     Problem: SAFETY ADULT  Goal: Patient will remain free of falls  Description: INTERVENTIONS:  - Educate patient/family on patient safety including physical limitations  - Instruct patient to call for assistance with activity   - Consult OT/PT to assist with strengthening/mobility   - Keep Call bell within reach  - Keep bed low and locked with side rails adjusted as appropriate  - Keep care items and personal belongings within reach  - Initiate and maintain comfort rounds  - Make Fall Risk Sign visible to staff  - Offer Toileting every 3 Hours, in advance of need  - Initiate/Maintain bed alarm  - Obtain necessary fall risk management equipment:   - Apply yellow socks and bracelet for high fall risk patients  - Consider moving patient to room near nurses station  Outcome: Progressing  Goal: Maintain or return to baseline ADL function  Description: INTERVENTIONS:  -  Assess patient's ability to carry out ADLs; assess patient's baseline for ADL function and identify physical deficits which impact ability to perform ADLs (bathing, care of mouth/teeth, toileting, grooming, dressing, etc.)  - Assess/evaluate cause of self-care deficits   - Assess range of motion  - Assess patient's mobility; develop plan if impaired  - Assess patient's need for assistive devices and provide as appropriate  - Encourage maximum independence but intervene and supervise when necessary  - Involve family in performance of ADLs  - Assess for home care needs following discharge   - Consider OT consult to assist with ADL evaluation and planning for discharge  - Provide patient education as appropriate  Outcome: Progressing  Goal: Maintains/Returns to pre admission functional level  Description: INTERVENTIONS:  - Perform BMAT or MOVE assessment daily.   - Set and communicate daily mobility goal to care team and patient/family/caregiver. - Collaborate with rehabilitation services on mobility goals if consulted  - Perform Range of Motion 3 times a day. - Reposition patient every 2 hours.   - Dangle patient 3 times a day  - Stand patient 3 times a day  - Ambulate patient 3 times a day  - Out of bed to chair 3 times a day   - Out of bed for meals 3 times a day  - Out of bed for toileting  - Record patient progress and toleration of activity level   Outcome: Progressing     Problem: DISCHARGE PLANNING  Goal: Discharge to home or other facility with appropriate resources  Description: INTERVENTIONS:  - Identify barriers to discharge w/patient and caregiver  - Arrange for needed discharge resources and transportation as appropriate  - Identify discharge learning needs (meds, wound care, etc.)  - Arrange for interpretive services to assist at discharge as needed  - Refer to Case Management Department for coordinating discharge planning if the patient needs post-hospital services based on physician/advanced practitioner order or complex needs related to functional status, cognitive ability, or social support system  Outcome: Progressing     Problem: Knowledge Deficit  Goal: Patient/family/caregiver demonstrates understanding of disease process, treatment plan, medications, and discharge instructions  Description: Complete learning assessment and assess knowledge base.   Interventions:  - Provide teaching at level of understanding  - Provide teaching via preferred learning methods  Outcome: Progressing

## 2023-10-10 NOTE — PROGRESS NOTES
Progress Note - General Surgery   Jordan Navarro 80 y.o. female MRN: [id number]  Unit/Bed#: -[facility] Encounter: [id number]      Assessment:   80-year-old female with rectal trauma/perforation after CT colonoscopy  -Imaging identified retroperitoneal and subcutaneous gas CT  Constipation  HDL      Plan:  -Patient's vitals and lab results are within normal range. We will keep patient n.p.o. to allow area of trauma further rest for improved healing.  -If patient stable tomorrow we will start clear liquids and advance as tolerated  -Analgesics and antiemetics as needed  -Continue to monitor bowel function  -Serial abdominal exams  -Also monitor vitals and lab results    Subjective/Objective   Chief Complaint: None  -    Subjective: Patient denies any complaints at all and states that she has no buttock,rectal pain, back or abdominal pain. Patient setting on the side of the bed and is able to stand without any issues. Objective:     Blood pressure 137/72, pulse 69, temperature (!) 97.3 °F (36.3 °C), resp. rate 18, height 5' 4.5" (1.638 m), weight 87 kg (191 lb 12.8 oz), SpO2 99 %, not currently breastfeeding. Body mass index is 32.41 kg/m².     I/O       10/08 0701  10/09 0700 10/09 0701  10/10 0700 10/10 0701  10/11 0700    IV Piggyback  1100     Total Intake(mL/kg)  1100 (12.6)     Net  +1100            Unmeasured Urine Occurrence  3 x     Unmeasured Stool Occurrence  1 x           Invasive Devices     Peripheral Intravenous Line  Duration           Peripheral IV 10/09/23 Right Antecubital 1 day                Physical Exam: /72   Pulse 69   Temp (!) 97.3 °F (36.3 °C)   Resp 18   Ht 5' 4.5" (1.638 m)   Wt 87 kg (191 lb 12.8 oz)   SpO2 99%   BMI 32.41 kg/m²   General appearance: alert and oriented, in no acute distress  Lungs: clear to auscultation bilaterally  Heart: regular rate and rhythm  Abdomen: soft, non-tender; bowel sounds normal; no masses,  no organomegaly  Extremities: extremities normal, warm and well-perfused; no cyanosis, clubbing, or edema    Labs   Recent Results (from the past 24 hour(s))   CBC and differential    Collection Time: 10/09/23  5:56 PM   Result Value Ref Range    WBC 13.00 (H) 4.31 - 10.16 Thousand/uL    RBC 4.79 3.81 - 5.12 Million/uL    Hemoglobin 14.2 11.5 - 15.4 g/dL    Hematocrit 43.4 34.8 - 46.1 %    MCV 91 82 - 98 fL    MCH 29.6 26.8 - 34.3 pg    MCHC 32.7 31.4 - 37.4 g/dL    RDW 12.8 11.6 - 15.1 %    MPV 8.6 (L) 8.9 - 12.7 fL    Platelets 980 218 - 661 Thousands/uL    nRBC 0 /100 WBCs    Neutrophils Relative 60 43 - 75 %    Immat GRANS % 0 0 - 2 %    Lymphocytes Relative 31 14 - 44 %    Monocytes Relative 7 4 - 12 %    Eosinophils Relative 1 0 - 6 %    Basophils Relative 1 0 - 1 %    Neutrophils Absolute 7.89 (H) 1.85 - 7.62 Thousands/µL    Immature Grans Absolute 0.03 0.00 - 0.20 Thousand/uL    Lymphocytes Absolute 3.99 0.60 - 4.47 Thousands/µL    Monocytes Absolute 0.87 0.17 - 1.22 Thousand/µL    Eosinophils Absolute 0.14 0.00 - 0.61 Thousand/µL    Basophils Absolute 0.08 0.00 - 0.10 Thousands/µL   Comprehensive metabolic panel    Collection Time: 10/09/23  5:56 PM   Result Value Ref Range    Sodium 138 135 - 147 mmol/L    Potassium 4.1 3.5 - 5.3 mmol/L    Chloride 104 96 - 108 mmol/L    CO2 23 21 - 32 mmol/L    ANION GAP 11 mmol/L    BUN 10 5 - 25 mg/dL    Creatinine 0.88 0.60 - 1.30 mg/dL    Glucose 111 65 - 140 mg/dL    Glucose, Fasting 111 (H) 65 - 99 mg/dL    Calcium 9.4 8.4 - 10.2 mg/dL    AST 26 13 - 39 U/L    ALT 12 7 - 52 U/L    Alkaline Phosphatase 69 34 - 104 U/L    Total Protein 7.8 6.4 - 8.4 g/dL    Albumin 4.2 3.5 - 5.0 g/dL    Total Bilirubin 0.83 0.20 - 1.00 mg/dL    eGFR 58 ml/min/1.73sq m   CBC and differential    Collection Time: 10/10/23 12:09 AM   Result Value Ref Range    WBC 10.39 (H) 4.31 - 10.16 Thousand/uL    RBC 4.52 3.81 - 5.12 Million/uL    Hemoglobin 13.4 11.5 - 15.4 g/dL    Hematocrit 40.6 34.8 - 46.1 %    MCV 90 82 - 98 fL    MCH 29.6 26.8 - 34.3 pg    MCHC 33.0 31.4 - 37.4 g/dL    RDW 12.7 11.6 - 15.1 %    MPV 8.4 (L) 8.9 - 12.7 fL    Platelets 734 845 - 648 Thousands/uL    nRBC 0 /100 WBCs    Neutrophils Relative 55 43 - 75 %    Immat GRANS % 0 0 - 2 %    Lymphocytes Relative 35 14 - 44 %    Monocytes Relative 7 4 - 12 %    Eosinophils Relative 2 0 - 6 %    Basophils Relative 1 0 - 1 %    Neutrophils Absolute 5.69 1.85 - 7.62 Thousands/µL    Immature Grans Absolute 0.02 0.00 - 0.20 Thousand/uL    Lymphocytes Absolute 3.62 0.60 - 4.47 Thousands/µL    Monocytes Absolute 0.75 0.17 - 1.22 Thousand/µL    Eosinophils Absolute 0.24 0.00 - 0.61 Thousand/µL    Basophils Absolute 0.07 0.00 - 0.10 Thousands/µL   Comprehensive metabolic panel    Collection Time: 10/10/23 12:09 AM   Result Value Ref Range    Sodium 138 135 - 147 mmol/L    Potassium 3.9 3.5 - 5.3 mmol/L    Chloride 105 96 - 108 mmol/L    CO2 24 21 - 32 mmol/L    ANION GAP 9 mmol/L    BUN 10 5 - 25 mg/dL    Creatinine 0.94 0.60 - 1.30 mg/dL    Glucose 124 65 - 140 mg/dL    Glucose, Fasting 124 (H) 65 - 99 mg/dL    Calcium 9.0 8.4 - 10.2 mg/dL    AST 23 13 - 39 U/L    ALT 12 7 - 52 U/L    Alkaline Phosphatase 62 34 - 104 U/L    Total Protein 7.1 6.4 - 8.4 g/dL    Albumin 3.8 3.5 - 5.0 g/dL    Total Bilirubin 0.94 0.20 - 1.00 mg/dL    eGFR 54 ml/min/1.73sq m   CBC and differential    Collection Time: 10/10/23  6:09 AM   Result Value Ref Range    WBC 8.87 4.31 - 10.16 Thousand/uL    RBC 4.31 3.81 - 5.12 Million/uL    Hemoglobin 12.9 11.5 - 15.4 g/dL    Hematocrit 38.9 34.8 - 46.1 %    MCV 90 82 - 98 fL    MCH 29.9 26.8 - 34.3 pg    MCHC 33.2 31.4 - 37.4 g/dL    RDW 12.7 11.6 - 15.1 %    MPV 8.6 (L) 8.9 - 12.7 fL    Platelets 269 307 - 366 Thousands/uL    nRBC 0 /100 WBCs    Neutrophils Relative 51 43 - 75 %    Immat GRANS % 0 0 - 2 %    Lymphocytes Relative 35 14 - 44 %    Monocytes Relative 9 4 - 12 %    Eosinophils Relative 4 0 - 6 %    Basophils Relative 1 0 - 1 %    Neutrophils Absolute 4.57 1.85 - 7.62 Thousands/µL    Immature Grans Absolute 0.02 0.00 - 0.20 Thousand/uL    Lymphocytes Absolute 3.07 0.60 - 4.47 Thousands/µL    Monocytes Absolute 0.79 0.17 - 1.22 Thousand/µL    Eosinophils Absolute 0.36 0.00 - 0.61 Thousand/µL    Basophils Absolute 0.06 0.00 - 0.10 Thousands/µL   Comprehensive metabolic panel    Collection Time: 10/10/23  6:09 AM   Result Value Ref Range    Sodium 138 135 - 147 mmol/L    Potassium 3.8 3.5 - 5.3 mmol/L    Chloride 105 96 - 108 mmol/L    CO2 26 21 - 32 mmol/L    ANION GAP 7 mmol/L    BUN 10 5 - 25 mg/dL    Creatinine 0.99 0.60 - 1.30 mg/dL    Glucose 127 65 - 140 mg/dL    Glucose, Fasting 127 (H) 65 - 99 mg/dL    Calcium 8.8 8.4 - 10.2 mg/dL    AST 23 13 - 39 U/L    ALT 12 7 - 52 U/L    Alkaline Phosphatase 58 34 - 104 U/L    Total Protein 7.0 6.4 - 8.4 g/dL    Albumin 3.7 3.5 - 5.0 g/dL    Total Bilirubin 1.09 (H) 0.20 - 1.00 mg/dL    eGFR 51 ml/min/1.73sq m        Imaging and other studies:  CT abdomen pelvis with contrast    Result Date: 10/9/2023  Impression: Redemonstration of extensive perianal subcutaneous emphysema now extending along the anterior abdominal wall and into the retroperitoneal space. Workstation performed: [id number]     CT colonoscopy diagnostic wo contrast    Addendum Date: 10/9/2023    ADDENDUM: Please note the soft tissue air densities described in the original report involve the ischioanal fossa and extends to the overlying medial gluteal soft tissues. Result Date: 10/9/2023  Impression: 1. Moderate to large amount of air is seen within the soft tissues of the perianal fossa on prone images which was not present on the supine images acquired immediately prior. Findings most compatible with underlying injury/defect involving the wall of the anal canal. There is no intrapelvic free air or pneumoperitoneum. 2. No evidence of colonic polyp of 6 mm or greater in size within the confines of the study.  There is sigmoid diverticulosis with incomplete distention of the sigmoid colon which does limit evaluation. 3. Cystic changes within the midpole of the left kidney which may represent abutting renal cyst versus mildly complex septated cyst. Nonemergent follow-up with renal ultrasound is recommended for further characterization 4. Previously identified pulmonary nodule measuring 7 mm within the right lower lobe is not included within the field-of-view on this exam. Please refer to prior CT abdomen pelvis report of 8/29/2023 for follow-up recommendations. Preliminary findings pertaining to air within the perianal soft tissues was discussed with Dr. Sundar Cobian by my colleague Dr. Romi Sawant shortly after image acquisition. The patient was subsequently escorted to the emergency department. The study was marked in [facility] for immediate notification.  Workstation performed: [id number]       VTE Pharmacologic Prophylaxis: Enoxaparin (Lovenox)  VTE Mechanical Prophylaxis: sequential compression device    Mat Thornton PA-C  10/10/2023

## 2023-10-10 NOTE — PROGRESS NOTES
Progress Note - Kristen Velasquez 80 y.o. female MRN: [id number]    Unit/Bed#: -01 Encounter: [id number]        Subjective:     Patient seen this morning at the bedside. She reports feeling hungry. She denies abdominal pain, and is passing gas. She did not require pain medication overnight, and is afebrile. ROS: As noted in the HPI, otherwise all others negative. Objective:     Vitals: Blood pressure 137/72, pulse 69, temperature (!) 97.3 °F (36.3 °C), resp. rate 18, height 5' 4.5" (1.638 m), weight 87 kg (191 lb 12.8 oz), SpO2 99 %, not currently breastfeeding. ,Body mass index is 32.41 kg/m². Intake/Output Summary (Last 24 hours) at 10/10/2023 1107  Last data filed at 10/10/2023 0233  Gross per 24 hour   Intake 100 ml   Output --   Net 100 ml       Physical Exam:     General Appearance: Alert and oriented x 3. In no respiratory distress  Lungs: Clear to auscultation bilaterally, no rales or rhonchi  Heart: Regular rate and rhythm, S1, S2 normal, no murmur, click, rub or gallop  Abdomen: Soft, non-tender, non-distended; bowel sounds normal; no masses or no organomegaly  Extremities: No cyanosis, edema    Invasive Devices     Peripheral Intravenous Line  Duration           Peripheral IV 10/09/23 Right Antecubital 1 day                Lab Results:  Results from last 7 days   Lab Units 10/10/23  0609   WBC Thousand/uL 8.87   HEMOGLOBIN g/dL 12.9   HEMATOCRIT % 38.9   PLATELETS Thousands/uL 252   NEUTROS PCT % 51   LYMPHS PCT % 35   MONOS PCT % 9   EOS PCT % 4     Results from last 7 days   Lab Units 10/10/23  0609   POTASSIUM mmol/L 3.8   CHLORIDE mmol/L 105   CO2 mmol/L 26   BUN mg/dL 10   CREATININE mg/dL 0.99   CALCIUM mg/dL 8.8   ALK PHOS U/L 58   ALT U/L 12   AST U/L 23               Imaging Studies: I have personally reviewed pertinent imaging studies.     CT abdomen pelvis with contrast    Result Date: 10/9/2023  Impression: Redemonstration of extensive perianal subcutaneous emphysema now extending along the anterior abdominal wall and into the retroperitoneal space. Workstation performed: [id number]     CT colonoscopy diagnostic wo contrast    Addendum Date: 10/9/2023    ADDENDUM: Please note the soft tissue air densities described in the original report involve the ischioanal fossa and extends to the overlying medial gluteal soft tissues. Result Date: 10/9/2023  Impression: 1. Moderate to large amount of air is seen within the soft tissues of the perianal fossa on prone images which was not present on the supine images acquired immediately prior. Findings most compatible with underlying injury/defect involving the wall of the anal canal. There is no intrapelvic free air or pneumoperitoneum. 2. No evidence of colonic polyp of 6 mm or greater in size within the confines of the study. There is sigmoid diverticulosis with incomplete distention of the sigmoid colon which does limit evaluation. 3. Cystic changes within the midpole of the left kidney which may represent abutting renal cyst versus mildly complex septated cyst. Nonemergent follow-up with renal ultrasound is recommended for further characterization 4. Previously identified pulmonary nodule measuring 7 mm within the right lower lobe is not included within the field-of-view on this exam. Please refer to prior CT abdomen pelvis report of 8/29/2023 for follow-up recommendations. Preliminary findings pertaining to air within the perianal soft tissues was discussed with Dr. Norm Burkitt by my colleague Dr. Laquita Gtz shortly after image acquisition. The patient was subsequently escorted to the emergency department. The study was marked in [facility] for immediate notification. Workstation performed: [id number]         Assessment and Plan:     1) Suspected rectal trauma with perforation post CT colonography, family history of colon cancer, rectal bleeding, and alternating bowel habits - Occurred following CT colonography on 10/9.  Was ordered for rectal bleeding despite Anusol course and bowel regimen. Family history of colon cancer in patient's son who passed away at age 77. Patient transferred from the radiology department directly to the ED. Initial CT did not show "obvious focal defect" but findings compatible for injury/defect in the anal canal. Fortunately, her abdominal exam remains benign and soft. She would like to have liquids, but would need to be re-evaluated by our surgery team prior to trial. She voices understanding.   - Conservative management with IV antibiotics and close monitoring for now, appreciate surgery recommendations   - IV Zosyn initiated   - Serial abdominal exams  - NPO for now, but will defer to our surgery team when to trial clears. Fortunately, labs and abdominal exam are reassuring   - CBC every 12 hours   - Continue to monitor hemodynamics      2) 2.3 cm left renal lesion, pulmonary nodules - Patient and patient's daughter in law at bedside not aware of finding. Will need to be followed up by a renal ultrasound after above. Again, CT chest as an outpatient as recommended by radiology in 6 months.       Portions of the record may have been created with voice recognition software. Occasional wrong word or "sound a like" substitutions may have occurred due to the inherent limitations of voice recognition software. Read the chart carefully and recognize, using context, where substitutions have occurred.

## 2023-10-11 PROBLEM — E66.09 CLASS 1 OBESITY DUE TO EXCESS CALORIES WITH SERIOUS COMORBIDITY AND BODY MASS INDEX (BMI) OF 32.0 TO 32.9 IN ADULT: Chronic | Status: ACTIVE | Noted: 2023-10-11

## 2023-10-11 PROBLEM — E66.09 CLASS 1 OBESITY DUE TO EXCESS CALORIES WITH SERIOUS COMORBIDITY AND BODY MASS INDEX (BMI) OF 32.0 TO 32.9 IN ADULT: Status: ACTIVE | Noted: 2023-10-11

## 2023-10-11 LAB
ALBUMIN SERPL BCP-MCNC: 3.6 G/DL (ref 3.5–5)
ALP SERPL-CCNC: 52 U/L (ref 34–104)
ALT SERPL W P-5'-P-CCNC: 12 U/L (ref 7–52)
ANION GAP SERPL CALCULATED.3IONS-SCNC: 10 MMOL/L
AST SERPL W P-5'-P-CCNC: 35 U/L (ref 13–39)
BASOPHILS # BLD AUTO: 0.1 THOUSANDS/ÂΜL (ref 0–0.1)
BASOPHILS NFR BLD AUTO: 1 % (ref 0–1)
BILIRUB SERPL-MCNC: 1.04 MG/DL (ref 0.2–1)
BUN SERPL-MCNC: 10 MG/DL (ref 5–25)
CALCIUM SERPL-MCNC: 8.8 MG/DL (ref 8.4–10.2)
CHLORIDE SERPL-SCNC: 106 MMOL/L (ref 96–108)
CO2 SERPL-SCNC: 23 MMOL/L (ref 21–32)
CREAT SERPL-MCNC: 0.87 MG/DL (ref 0.6–1.3)
EOSINOPHIL # BLD AUTO: 0.54 THOUSAND/ÂΜL (ref 0–0.61)
EOSINOPHIL NFR BLD AUTO: 7 % (ref 0–6)
ERYTHROCYTE [DISTWIDTH] IN BLOOD BY AUTOMATED COUNT: 12.6 % (ref 11.6–15.1)
GFR SERPL CREATININE-BSD FRML MDRD: 59 ML/MIN/1.73SQ M
GLUCOSE SERPL-MCNC: 106 MG/DL (ref 65–140)
HCT VFR BLD AUTO: 39.6 % (ref 34.8–46.1)
HGB BLD-MCNC: 12.8 G/DL (ref 11.5–15.4)
IMM GRANULOCYTES # BLD AUTO: 0.02 THOUSAND/UL (ref 0–0.2)
IMM GRANULOCYTES NFR BLD AUTO: 0 % (ref 0–2)
LYMPHOCYTES # BLD AUTO: 2.87 THOUSANDS/ÂΜL (ref 0.6–4.47)
LYMPHOCYTES NFR BLD AUTO: 37 % (ref 14–44)
MAGNESIUM SERPL-MCNC: 2 MG/DL (ref 1.9–2.7)
MCH RBC QN AUTO: 29.3 PG (ref 26.8–34.3)
MCHC RBC AUTO-ENTMCNC: 32.3 G/DL (ref 31.4–37.4)
MCV RBC AUTO: 91 FL (ref 82–98)
MONOCYTES # BLD AUTO: 0.75 THOUSAND/ÂΜL (ref 0.17–1.22)
MONOCYTES NFR BLD AUTO: 10 % (ref 4–12)
NEUTROPHILS # BLD AUTO: 3.55 THOUSANDS/ÂΜL (ref 1.85–7.62)
NEUTS SEG NFR BLD AUTO: 45 % (ref 43–75)
NRBC BLD AUTO-RTO: 0 /100 WBCS
PLATELET # BLD AUTO: 237 THOUSANDS/UL (ref 149–390)
PMV BLD AUTO: 8.4 FL (ref 8.9–12.7)
POTASSIUM SERPL-SCNC: 3.6 MMOL/L (ref 3.5–5.3)
PROT SERPL-MCNC: 6.9 G/DL (ref 6.4–8.4)
RBC # BLD AUTO: 4.37 MILLION/UL (ref 3.81–5.12)
SODIUM SERPL-SCNC: 139 MMOL/L (ref 135–147)
WBC # BLD AUTO: 7.83 THOUSAND/UL (ref 4.31–10.16)

## 2023-10-11 PROCEDURE — 99232 SBSQ HOSP IP/OBS MODERATE 35: CPT | Performed by: INTERNAL MEDICINE

## 2023-10-11 PROCEDURE — 99233 SBSQ HOSP IP/OBS HIGH 50: CPT | Performed by: INTERNAL MEDICINE

## 2023-10-11 PROCEDURE — 85025 COMPLETE CBC W/AUTO DIFF WBC: CPT | Performed by: INTERNAL MEDICINE

## 2023-10-11 PROCEDURE — 83735 ASSAY OF MAGNESIUM: CPT | Performed by: INTERNAL MEDICINE

## 2023-10-11 PROCEDURE — 99232 SBSQ HOSP IP/OBS MODERATE 35: CPT | Performed by: STUDENT IN AN ORGANIZED HEALTH CARE EDUCATION/TRAINING PROGRAM

## 2023-10-11 PROCEDURE — 80053 COMPREHEN METABOLIC PANEL: CPT | Performed by: INTERNAL MEDICINE

## 2023-10-11 RX ADMIN — PIPERACILLIN AND TAZOBACTAM 3.38 G: 36; 4.5 INJECTION, POWDER, FOR SOLUTION INTRAVENOUS at 01:22

## 2023-10-11 RX ADMIN — PIPERACILLIN AND TAZOBACTAM 3.38 G: 36; 4.5 INJECTION, POWDER, FOR SOLUTION INTRAVENOUS at 19:37

## 2023-10-11 RX ADMIN — PIPERACILLIN AND TAZOBACTAM 3.38 G: 36; 4.5 INJECTION, POWDER, FOR SOLUTION INTRAVENOUS at 14:34

## 2023-10-11 RX ADMIN — PIPERACILLIN AND TAZOBACTAM 3.38 G: 36; 4.5 INJECTION, POWDER, FOR SOLUTION INTRAVENOUS at 08:43

## 2023-10-11 NOTE — ASSESSMENT & PLAN NOTE
Px after abdominal pain and abnormal findings of CT colonoscopy diagnostic.  Concern for rectal perf vs tear after receiving contrast enema  CT showing extensive perianal subcutaneous emphysema now extending along the anterior abdominal wall and into the retroperitoneal space    ED discussed cased with surgery who recommended slim admission with GI consultation  Surgery and GI reccs appreciated  Per surgery continue with conservation management  ADAT - now on clear liquid diet  Pain management  Serial abdominal exam  IV zosyn to be transitioned to PO antibiotics prior to discharge  q6 hours cbc and cmp per GI  If pt shows signs of infection, may need to consider loop colostomy

## 2023-10-11 NOTE — ASSESSMENT & PLAN NOTE
Body mass index is 32.41 kg/m².     Recommend incorporating a more whole foods plant-predominant diet along with decreasing consumption of red meats and processed foods  Per AHA guidelines, recommend moderate-vigorous intensity exercise for 30 minutes a day for 5 days a week or a total of 150 min/week

## 2023-10-11 NOTE — PROGRESS NOTES
Progress Note - Graham Fontenot 80 y.o. female MRN: [id number]    Unit/Bed#: -[facility] Encounter: [id number]        Subjective:     Patient denies abdominal pain, back pain or pain in her buttocks. She reports having a brown bowel movement this morning. She is looking forward to trialing diet. ROS: As noted in the HPI, otherwise all others negative. Objective:     Vitals: Blood pressure 127/68, pulse 67, temperature 98 °F (36.7 °C), temperature source Oral, resp. rate 18, height 5' 4.5" (1.638 m), weight 87 kg (191 lb 12.8 oz), SpO2 98 %, not currently breastfeeding. ,Body mass index is 32.41 kg/m². Intake/Output Summary (Last 24 hours) at 10/11/2023 1136  Last data filed at 10/11/2023 0317  Gross per 24 hour   Intake 200 ml   Output --   Net 200 ml       Physical Exam:     General Appearance: Alert and oriented x 3. In no respiratory distress  Lungs: Clear to auscultation bilaterally, no rales or rhonchi  Heart: Regular rate and rhythm, S1, S2 normal, no murmur, click, rub or gallop  Abdomen: Soft, non-tender, non-distended; bowel sounds normal; no masses or no organomegaly  Extremities: No cyanosis, edema    Invasive Devices       Peripheral Intravenous Line  Duration             Peripheral IV 10/11/23 Distal;Left;Ventral (anterior) Forearm <1 day                    Lab Results:  Results from last 7 days   Lab Units 10/11/23  0620   WBC Thousand/uL 7.83   HEMOGLOBIN g/dL 12.8   HEMATOCRIT % 39.6   PLATELETS Thousands/uL 237   NEUTROS PCT % 45   LYMPHS PCT % 37   MONOS PCT % 10   EOS PCT % 7*     Results from last 7 days   Lab Units 10/11/23  0620   POTASSIUM mmol/L 3.6   CHLORIDE mmol/L 106   CO2 mmol/L 23   BUN mg/dL 10   CREATININE mg/dL 0.87   CALCIUM mg/dL 8.8   ALK PHOS U/L 52   ALT U/L 12   AST U/L 35               Imaging Studies: I have personally reviewed pertinent imaging studies.     CT abdomen pelvis with contrast    Result Date: 10/9/2023  Impression: Redemonstration of extensive perianal subcutaneous emphysema now extending along the anterior abdominal wall and into the retroperitoneal space. Workstation performed: [id number]     CT colonoscopy diagnostic wo contrast    Addendum Date: 10/9/2023    ADDENDUM: Please note the soft tissue air densities described in the original report involve the ischioanal fossa and extends to the overlying medial gluteal soft tissues. Result Date: 10/9/2023  Impression: 1. Moderate to large amount of air is seen within the soft tissues of the perianal fossa on prone images which was not present on the supine images acquired immediately prior. Findings most compatible with underlying injury/defect involving the wall of the anal canal. There is no intrapelvic free air or pneumoperitoneum. 2. No evidence of colonic polyp of 6 mm or greater in size within the confines of the study. There is sigmoid diverticulosis with incomplete distention of the sigmoid colon which does limit evaluation. 3. Cystic changes within the midpole of the left kidney which may represent abutting renal cyst versus mildly complex septated cyst. Nonemergent follow-up with renal ultrasound is recommended for further characterization 4. Previously identified pulmonary nodule measuring 7 mm within the right lower lobe is not included within the field-of-view on this exam. Please refer to prior CT abdomen pelvis report of 8/29/2023 for follow-up recommendations. Preliminary findings pertaining to air within the perianal soft tissues was discussed with Dr. Marcella Sagastume by my colleague Dr. Piper Adams shortly after image acquisition. The patient was subsequently escorted to the emergency department. The study was marked in [facility] for immediate notification. Workstation performed: [id number]         Assessment and Plan:     1) Suspected rectal trauma with perforation post CT colonography, family history of colon cancer, rectal bleeding, and alternating bowel habits - Occurred following CT colonography on 10/9. Was ordered for rectal bleeding despite Anusol course and bowel regimen. Family history of colon cancer in patient's son who passed away at age 77. Patient transferred from the radiology department directly to the ED. Initial CT did not show "obvious focal defect" but findings compatible for injury/defect in the anal canal. Fortunately, her abdominal exam remains benign and soft. - Conservative management with IV antibiotics and close monitoring for now, appreciate surgery recommendations   - IV Zosyn  - Serial abdominal exams  - Spoke with Brien Mcrae PA-C from the surgery team, and OK to start clear liquids   - CBC every 12 hours   - Continue to monitor hemodynamics      2) 2.3 cm left renal lesion, pulmonary nodules - Patient and patient's daughter in law at bedside not aware of finding. Will need to be followed up by a renal ultrasound after above. Again, CT chest as an outpatient as recommended by radiology in 6 months. Portions of the record may have been created with voice recognition software. Occasional wrong word or "sound a like" substitutions may have occurred due to the inherent limitations of voice recognition software. Read the chart carefully and recognize, using context, where substitutions have occurred.

## 2023-10-11 NOTE — PLAN OF CARE
Problem: PAIN - ADULT  Goal: Verbalizes/displays adequate comfort level or baseline comfort level  Description: Interventions:  - Encourage patient to monitor pain and request assistance  - Assess pain using appropriate pain scale  - Administer analgesics based on type and severity of pain and evaluate response  - Implement non-pharmacological measures as appropriate and evaluate response  - Consider cultural and social influences on pain and pain management  - Notify physician/advanced practitioner if interventions unsuccessful or patient reports new pain  Outcome: Progressing     Problem: INFECTION - ADULT  Goal: Absence or prevention of progression during hospitalization  Description: INTERVENTIONS:  - Assess and monitor for signs and symptoms of infection  - Monitor lab/diagnostic results  - Monitor all insertion sites, i.e. indwelling lines, tubes, and drains  - Monitor endotracheal if appropriate and nasal secretions for changes in amount and color  - [place] appropriate cooling/warming therapies per order  - Administer medications as ordered  - Instruct and encourage patient and family to use good hand hygiene technique  - Identify and instruct in appropriate isolation precautions for identified infection/condition  Outcome: Progressing  Goal: Absence of fever/infection during neutropenic period  Description: INTERVENTIONS:  - Monitor WBC    Outcome: Progressing     Problem: SAFETY ADULT  Goal: Patient will remain free of falls  Description: INTERVENTIONS:  - Educate patient/family on patient safety including physical limitations  - Instruct patient to call for assistance with activity   - Consult OT/PT to assist with strengthening/mobility   - Keep Call bell within reach  - Keep bed low and locked with side rails adjusted as appropriate  - Keep care items and personal belongings within reach  - Initiate and maintain comfort rounds  - Make Fall Risk Sign visible to staff  - Offer Toileting every 3 Hours, in advance of need  - Initiate/Maintain bed alarm  - Obtain necessary fall risk management equipment:   - Apply yellow socks and bracelet for high fall risk patients  - Consider moving patient to room near nurses station  Outcome: Progressing  Goal: Maintain or return to baseline ADL function  Description: INTERVENTIONS:  -  Assess patient's ability to carry out ADLs; assess patient's baseline for ADL function and identify physical deficits which impact ability to perform ADLs (bathing, care of mouth/teeth, toileting, grooming, dressing, etc.)  - Assess/evaluate cause of self-care deficits   - Assess range of motion  - Assess patient's mobility; develop plan if impaired  - Assess patient's need for assistive devices and provide as appropriate  - Encourage maximum independence but intervene and supervise when necessary  - Involve family in performance of ADLs  - Assess for home care needs following discharge   - Consider OT consult to assist with ADL evaluation and planning for discharge  - Provide patient education as appropriate  Outcome: Progressing  Goal: Maintains/Returns to pre admission functional level  Description: INTERVENTIONS:  - Perform BMAT or MOVE assessment daily.   - Set and communicate daily mobility goal to care team and patient/family/caregiver. - Collaborate with rehabilitation services on mobility goals if consulted  - Perform Range of Motion 3 times a day. - Reposition patient every 2 hours.   - Dangle patient 3 times a day  - Stand patient 3 times a day  - Ambulate patient 3 times a day  - Out of bed to chair 3 times a day   - Out of bed for meals 3 times a day  - Out of bed for toileting  - Record patient progress and toleration of activity level   Outcome: Progressing     Problem: DISCHARGE PLANNING  Goal: Discharge to home or other facility with appropriate resources  Description: INTERVENTIONS:  - Identify barriers to discharge w/patient and caregiver  - Arrange for needed discharge resources and transportation as appropriate  - Identify discharge learning needs (meds, wound care, etc.)  - Arrange for interpretive services to assist at discharge as needed  - Refer to Case Management Department for coordinating discharge planning if the patient needs post-hospital services based on physician/advanced practitioner order or complex needs related to functional status, cognitive ability, or social support system  Outcome: Progressing     Problem: Knowledge Deficit  Goal: Patient/family/caregiver demonstrates understanding of disease process, treatment plan, medications, and discharge instructions  Description: Complete learning assessment and assess knowledge base.   Interventions:  - Provide teaching at level of understanding  - Provide teaching via preferred learning methods  Outcome: Progressing     Problem: Prexisting or High Potential for Compromised Skin Integrity  Goal: Skin integrity is maintained or improved  Description: INTERVENTIONS:  - Identify patients at risk for skin breakdown  - Assess and monitor skin integrity  - Assess and monitor nutrition and hydration status  - Monitor labs   - Assess for incontinence   - Turn and reposition patient  - Assist with mobility/ambulation  - Relieve pressure over bony prominences  - Avoid friction and shearing  - Provide appropriate hygiene as needed including keeping skin clean and dry  - Evaluate need for skin moisturizer/barrier cream  - Collaborate with interdisciplinary team   - Patient/family teaching  - Consider wound care consult   Outcome: Progressing

## 2023-10-11 NOTE — PLAN OF CARE
Problem: PAIN - ADULT  Goal: Verbalizes/displays adequate comfort level or baseline comfort level  Description: Interventions:  - Encourage patient to monitor pain and request assistance  - Assess pain using appropriate pain scale  - Administer analgesics based on type and severity of pain and evaluate response  - Implement non-pharmacological measures as appropriate and evaluate response  - Consider cultural and social influences on pain and pain management  - Notify physician/advanced practitioner if interventions unsuccessful or patient reports new pain  Outcome: Progressing     Problem: INFECTION - ADULT  Goal: Absence or prevention of progression during hospitalization  Description: INTERVENTIONS:  - Assess and monitor for signs and symptoms of infection  - Monitor lab/diagnostic results  - Monitor all insertion sites, i.e. indwelling lines, tubes, and drains  - Monitor endotracheal if appropriate and nasal secretions for changes in amount and color  - [place] appropriate cooling/warming therapies per order  - Administer medications as ordered  - Instruct and encourage patient and family to use good hand hygiene technique  - Identify and instruct in appropriate isolation precautions for identified infection/condition  Outcome: Progressing  Goal: Absence of fever/infection during neutropenic period  Description: INTERVENTIONS:  - Monitor WBC    Outcome: Progressing     Problem: SAFETY ADULT  Goal: Patient will remain free of falls  Description: INTERVENTIONS:  - Educate patient/family on patient safety including physical limitations  - Instruct patient to call for assistance with activity   - Consult OT/PT to assist with strengthening/mobility   - Keep Call bell within reach  - Keep bed low and locked with side rails adjusted as appropriate  - Keep care items and personal belongings within reach  - Initiate and maintain comfort rounds  - Make Fall Risk Sign visible to staff  - Offer Toileting every Hours, in advance of need  - Initiate/Maintain alarm  - Obtain necessary fall risk management equipment:   - Apply yellow socks and bracelet for high fall risk patients  - Consider moving patient to room near nurses station  Outcome: Progressing  Goal: Maintain or return to baseline ADL function  Description: INTERVENTIONS:  -  Assess patient's ability to carry out ADLs; assess patient's baseline for ADL function and identify physical deficits which impact ability to perform ADLs (bathing, care of mouth/teeth, toileting, grooming, dressing, etc.)  - Assess/evaluate cause of self-care deficits   - Assess range of motion  - Assess patient's mobility; develop plan if impaired  - Assess patient's need for assistive devices and provide as appropriate  - Encourage maximum independence but intervene and supervise when necessary  - Involve family in performance of ADLs  - Assess for home care needs following discharge   - Consider OT consult to assist with ADL evaluation and planning for discharge  - Provide patient education as appropriate  Outcome: Progressing  Goal: Maintains/Returns to pre admission functional level  Description: INTERVENTIONS:  - Perform BMAT or MOVE assessment daily.   - Set and communicate daily mobility goal to care team and patient/family/caregiver. - Collaborate with rehabilitation services on mobility goals if consulted  - Perform Range of Motion  times a day. - Reposition patient every hours.   - Dangle patient times a day  - Stand patient  times a day  - Ambulate patient  times a day  - Out of bed to chair  times a day   - Out of bed for meals  times a day  - Out of bed for toileting  - Record patient progress and toleration of activity level   Outcome: Progressing     Problem: DISCHARGE PLANNING  Goal: Discharge to home or other facility with appropriate resources  Description: INTERVENTIONS:  - Identify barriers to discharge w/patient and caregiver  - Arrange for needed discharge resources and transportation as appropriate  - Identify discharge learning needs (meds, wound care, etc.)  - Arrange for interpretive services to assist at discharge as needed  - Refer to Case Management Department for coordinating discharge planning if the patient needs post-hospital services based on physician/advanced practitioner order or complex needs related to functional status, cognitive ability, or social support system  Outcome: Progressing     Problem: Knowledge Deficit  Goal: Patient/family/caregiver demonstrates understanding of disease process, treatment plan, medications, and discharge instructions  Description: Complete learning assessment and assess knowledge base.   Interventions:  - Provide teaching at level of understanding  - Provide teaching via preferred learning methods  Outcome: Progressing     Problem: Prexisting or High Potential for Compromised Skin Integrity  Goal: Skin integrity is maintained or improved  Description: INTERVENTIONS:  - Identify patients at risk for skin breakdown  - Assess and monitor skin integrity  - Assess and monitor nutrition and hydration status  - Monitor labs   - Assess for incontinence   - Turn and reposition patient  - Assist with mobility/ambulation  - Relieve pressure over bony prominences  - Avoid friction and shearing  - Provide appropriate hygiene as needed including keeping skin clean and dry  - Evaluate need for skin moisturizer/barrier cream  - Collaborate with interdisciplinary team   - Patient/family teaching  - Consider wound care consult   Outcome: Progressing

## 2023-10-11 NOTE — PROGRESS NOTES
Progress Note - General Surgery   [facility] 80 [facility] MRN: [id number]  Unit/Bed#: -Soren Encounter: [id number]    Assessment/Plan  Perianal Subcutaneous Emphysema extending to the anterior abdominal wall and into the retroperitoneal space. Rectal Trauma/Perforation post CT Colonography. Defect in the anal canal.       Pt denies pain. She had a normal brown colored stool this morning, no nausea or vomiting. Abd is soft, no crepitus, distention, non tender     -advance to CLD and observe  -anticipate dischare next 24-48 hours. Chief Complaint: I had a regular brown bowel movement today. I don't have any pain, nausea, no vomiting. Objective/Exam: Blood pressure 127/68, pulse 67, temperature 98 °F (36.7 °C), temperature source Oral, resp. rate 18, height 5' 4.5" (1.638 m), weight 87 kg (191 lb 12.8 oz), SpO2 98 %, not currently breastfeeding.     Wound Culure: No results found for: "WOUNDCULT"      General Appearance:    Alert and orientated x 3, cooperative, no distress   Lungs:     Clear to auscultation bilaterally, respirations unlabored    Heart:    Regular rate and rhythm   Abdomen:    Soft, NT, no crepitus, no distention   NBS           Extremities:   Extremities normal,  no cyanosis or edema   Pulses:   2+ and symmetric all extremities, no calf tenderness   Skin:   Skin color, texture, turgor normal, no rashes or lesions   Neurologic:   CNII-XII intact, normal strength, sensation and reflexes     Throughout, affect appropriate       ,      Intake/Output Summary (Last 24 hours) at 10/11/2023 1141  Last data filed at 10/11/2023 0317  Gross per 24 hour   Intake 200 ml   Output --   Net 200 ml       Invasive Devices       Peripheral Intravenous Line  Duration             Peripheral IV 10/11/23 Distal;Left;Ventral (anterior) Forearm <1 day                                            Labs: CBC with diff: @RESUFAST(WBC,HGB,HCT,MCV,PLT,ADJUSTEDWBC, RBC,MCH,MCHC,RDW,MPV,NRBC,TOTALCELLSCOUNTED,SEGS%,GRANS%,LYMPHS%,EOS%,BASO%,ABNEUT,ABGRANS,ABLYMPHS,ABMOMOS,ABEOS,ABBASO)@,   BMP/CMP:  Lab Results   Component Value Date    K 3.6 10/11/2023     10/11/2023    CO2 23 10/11/2023    BUN 10 10/11/2023    CREATININE 0.87 10/11/2023    CALCIUM 8.8 10/11/2023    AST 35 10/11/2023    ALT 12 10/11/2023    ALKPHOS 52 10/11/2023    EGFR 59 10/11/2023   ,   Lipid Panel: No results found for: "CHOL",   Coags:   Lab Results   Component Value Date    PTT 33 08/29/2023    INR 1.05 08/29/2023   ,     Blood Culture: No results found for: "BLOODCX",   Urinalysis: No results found for: "COLORU", "CLARITYU", "SPECGRAV", "PHUR", "LEUKOCYTESUR", "NITRITE", "PROTEINUA", "GLUCOSEU", "Soundra Null", "Belita Tish", "BLOODU",   Urine Culture: No results found for: "URINECX",         Imaging: CT abdomen pelvis with contrast    Result Date: 10/9/2023  Impression: Redemonstration of extensive perianal subcutaneous emphysema now extending along the anterior abdominal wall and into the retroperitoneal space. Workstation performed: [id number]     CT colonoscopy diagnostic wo contrast    Addendum Date: 10/9/2023    ADDENDUM: Please note the soft tissue air densities described in the original report involve the ischioanal fossa and extends to the overlying medial gluteal soft tissues. Result Date: 10/9/2023  Impression: 1. Moderate to large amount of air is seen within the soft tissues of the perianal fossa on prone images which was not present on the supine images acquired immediately prior. Findings most compatible with underlying injury/defect involving the wall of the anal canal. There is no intrapelvic free air or pneumoperitoneum. 2. No evidence of colonic polyp of 6 mm or greater in size within the confines of the study. There is sigmoid diverticulosis with incomplete distention of the sigmoid colon which does limit evaluation.  3. Cystic changes within the midpole of the left kidney which may represent abutting renal cyst versus mildly complex septated cyst. Nonemergent follow-up with renal ultrasound is recommended for further characterization 4. Previously identified pulmonary nodule measuring 7 mm within the right lower lobe is not included within the field-of-view on this exam. Please refer to prior CT abdomen pelvis report of 8/29/2023 for follow-up recommendations. Preliminary findings pertaining to air within the perianal soft tissues was discussed with Dr. Barnes Hidden by my colleague Dr. Susana Echavarria shortly after image acquisition. The patient was subsequently escorted to the emergency department. The study was marked in [facility] for immediate notification.  Workstation performed: [email or web address] Woodall PA-C  10/11/2023

## 2023-10-11 NOTE — PROGRESS NOTES
[address]  Progress Note  Name: Nima Mathew I  MRN: [id number]  Unit/Bed#: -01 I Date of Admission: 10/9/2023   Date of Service: 10/11/2023 I Hospital Day: 1    Assessment/Plan   Rectal perforation [facility])  Assessment & Plan  Px after abdominal pain and abnormal findings of CT colonoscopy diagnostic. Concern for rectal perf vs tear after receiving contrast enema  CT showing extensive perianal subcutaneous emphysema now extending along the anterior abdominal wall and into the retroperitoneal space    ED discussed cased with surgery who recommended slim admission with GI consultation  Surgery and GI reccs appreciated  Per surgery continue with conservation management  ADAT - now on clear liquid diet  Pain management  Serial abdominal exam  IV zosyn to be transitioned to PO antibiotics prior to discharge  q6 hours cbc and cmp per GI  If pt shows signs of infection, may need to consider loop colostomy    Class 1 obesity due to excess calories with serious comorbidity and body mass index (BMI) of 32.0 to 32.9 in adult  Assessment & Plan  Body mass index is 32.41 kg/m². Recommend incorporating a more whole foods plant-predominant diet along with decreasing consumption of red meats and processed foods  Per AHA guidelines, recommend moderate-vigorous intensity exercise for 30 minutes a day for 5 days a week or a total of 150 min/week      Hypercholesteremia  Assessment & Plan  Pt is on asa 81 mg daily and statin  Resume when able to take PO     Chronic constipation  Assessment & Plan  Home dose of miralax and senna once able to tolerate p.o. Anxiety  Assessment & Plan  Resume home dose antidepressant when able to tolerate PO               VTE Pharmacologic Prophylaxis:   High Risk (Score >/= 5) - Pharmacological DVT Prophylaxis Ordered: enoxaparin (Lovenox). Sequential Compression Devices Ordered.     Patient Centered Rounds: I performed bedside rounds with nursing staff today.  Discussions with Specialists or Other Care Team Provider:  IP CONSULT TO ACUTE CARE SURGERY  IP CONSULT TO GASTROENTEROLOGY      Education and Discussions with Family / Patient: patient,  at bedside    Total Time Spent on Date of Encounter in care of patient: 30+ mins. This time was spent on one or more of the following: performing physical exam; counseling and coordination of care; obtaining or reviewing history; documenting in the medical record; reviewing/ordering tests, medications or procedures; communicating with other healthcare professionals and discussing with patient's family/caregivers. Current Length of Stay: 1 day(s)  Current Patient Status: Inpatient   Certification Statement: The patient will continue to require additional inpatient hospital stay due to plan as noted above  Discharge Plan: Anticipate discharge in 24-48 hrs to home. Code Status: Level 3 - DNAR and DNI    Subjective:   Offers no new complaints at this time. No acute events reported overnight. Understanding of plan. All questions answered. Objective:     Vitals:   Temp (24hrs), Av.1 °F (36.7 °C), Min:98 °F (36.7 °C), Max:98.1 °F (36.7 °C)    Temp:  [98 °F (36.7 °C)-98.1 °F (36.7 °C)] 98.1 °F (36.7 °C)  HR:  [57-72] 57  Resp:  [18] 18  BP: (127-140)/(67-73) 130/67  SpO2:  [90 %-99 %] 96 %  Body mass index is 32.41 kg/m². Input and Output Summary (last 24 hours): Intake/Output Summary (Last 24 hours) at 10/11/2023 1717  Last data filed at 10/11/2023 0317  Gross per 24 hour   Intake 200 ml   Output --   Net 200 ml       Physical Exam:   Physical Exam  Vitals and nursing note reviewed. Constitutional:       General: She is not in acute distress. Appearance: She is well-developed. She is not diaphoretic. HENT:      Head: Normocephalic and atraumatic. Nose: Nose normal.      Mouth/Throat:      Pharynx: No oropharyngeal exudate. Eyes:      General: No scleral icterus.         Right eye: No discharge. Left eye: No discharge. Conjunctiva/sclera: Conjunctivae normal.   Cardiovascular:      Rate and Rhythm: Normal rate and regular rhythm. Heart sounds: Normal heart sounds. No murmur heard. No friction rub. No gallop. Pulmonary:      Effort: Pulmonary effort is normal. No respiratory distress. Breath sounds: Normal breath sounds. No wheezing or rales. Abdominal:      General: Bowel sounds are normal. There is distension. Palpations: Abdomen is soft. Tenderness: There is abdominal tenderness. There is no guarding or rebound. Musculoskeletal:         General: Normal range of motion. Cervical back: Normal range of motion and neck supple. Skin:     General: Skin is warm and dry. Findings: No erythema. Neurological:      Mental Status: She is alert and oriented to person, place, and time.    Psychiatric:         Behavior: Behavior normal.         Additional Data:     Labs:  Results from last 7 days   Lab Units 10/11/23  0620   WBC Thousand/uL 7.83   HEMOGLOBIN g/dL 12.8   HEMATOCRIT % 39.6   PLATELETS Thousands/uL 237   NEUTROS PCT % 45   LYMPHS PCT % 37   MONOS PCT % 10   EOS PCT % 7*     Results from last 7 days   Lab Units 10/11/23  0620   SODIUM mmol/L 139   POTASSIUM mmol/L 3.6   CHLORIDE mmol/L 106   CO2 mmol/L 23   BUN mg/dL 10   CREATININE mg/dL 0.87   ANION GAP mmol/L 10   CALCIUM mg/dL 8.8   ALBUMIN g/dL 3.6   TOTAL BILIRUBIN mg/dL 1.04*   ALK PHOS U/L 52   ALT U/L 12   AST U/L 35   GLUCOSE RANDOM mg/dL 106                       Lines/Drains:  Invasive Devices       Peripheral Intravenous Line  Duration             Peripheral IV 10/11/23 Distal;Left;Ventral (anterior) Forearm <1 day                          Imaging: Reviewed radiology reports from this admission including: abdominal/pelvic CT and procedure reports  CT abdomen pelvis with contrast    Result Date: 10/9/2023  Impression: Redemonstration of extensive perianal subcutaneous emphysema now extending along the anterior abdominal wall and into the retroperitoneal space. Workstation performed: [id number]     CT colonoscopy diagnostic wo contrast    Addendum Date: 10/9/2023    ADDENDUM: Please note the soft tissue air densities described in the original report involve the ischioanal fossa and extends to the overlying medial gluteal soft tissues. Result Date: 10/9/2023  Impression: 1. Moderate to large amount of air is seen within the soft tissues of the perianal fossa on prone images which was not present on the supine images acquired immediately prior. Findings most compatible with underlying injury/defect involving the wall of the anal canal. There is no intrapelvic free air or pneumoperitoneum. 2. No evidence of colonic polyp of 6 mm or greater in size within the confines of the study. There is sigmoid diverticulosis with incomplete distention of the sigmoid colon which does limit evaluation. 3. Cystic changes within the midpole of the left kidney which may represent abutting renal cyst versus mildly complex septated cyst. Nonemergent follow-up with renal ultrasound is recommended for further characterization 4. Previously identified pulmonary nodule measuring 7 mm within the right lower lobe is not included within the field-of-view on this exam. Please refer to prior CT abdomen pelvis report of 8/29/2023 for follow-up recommendations. Preliminary findings pertaining to air within the perianal soft tissues was discussed with Dr. Vania Berman by my colleague Dr. Maurice Canela shortly after image acquisition. The patient was subsequently escorted to the emergency department. The study was marked in [facility] for immediate notification. Workstation performed: [id number]       No Chest XR results available for this patient. No results found for this or any previous visit.       Recent Cultures (last 7 days):         Last 24 Hours Medication List:   Current Facility-Administered Medications   Medication Dose Route Frequency Provider Last Rate    HYDROmorphone  0.2 mg Intravenous Q4H PRN Leela Arlene Lewaial, DO      piperacillin-tazobactam  3.375 g Intravenous Q6H Leela Arlene Lewaiya, DO 3.375 g (10/11/23 1434)        Today, Patient Was Seen By: Alie Galvez DO    **Please Note: This note may have been constructed using a voice recognition system. **

## 2023-10-12 VITALS
RESPIRATION RATE: 18 BRPM | DIASTOLIC BLOOD PRESSURE: 75 MMHG | SYSTOLIC BLOOD PRESSURE: 146 MMHG | WEIGHT: 191.8 LBS | BODY MASS INDEX: 31.96 KG/M2 | OXYGEN SATURATION: 98 % | TEMPERATURE: 97.9 F | HEIGHT: 65 IN | HEART RATE: 73 BPM

## 2023-10-12 PROBLEM — K61.1 PERIRECTAL ABSCESS: Status: ACTIVE | Noted: 2023-10-12

## 2023-10-12 LAB
ALBUMIN SERPL BCP-MCNC: 4 G/DL (ref 3.5–5)
ALP SERPL-CCNC: 55 U/L (ref 34–104)
ALT SERPL W P-5'-P-CCNC: 16 U/L (ref 7–52)
ANION GAP SERPL CALCULATED.3IONS-SCNC: 8 MMOL/L
AST SERPL W P-5'-P-CCNC: 45 U/L (ref 13–39)
BASOPHILS # BLD AUTO: 0.08 THOUSANDS/ÂΜL (ref 0–0.1)
BASOPHILS NFR BLD AUTO: 1 % (ref 0–1)
BILIRUB SERPL-MCNC: 0.97 MG/DL (ref 0.2–1)
BUN SERPL-MCNC: 7 MG/DL (ref 5–25)
CALCIUM SERPL-MCNC: 9 MG/DL (ref 8.4–10.2)
CHLORIDE SERPL-SCNC: 106 MMOL/L (ref 96–108)
CO2 SERPL-SCNC: 25 MMOL/L (ref 21–32)
CREAT SERPL-MCNC: 0.89 MG/DL (ref 0.6–1.3)
EOSINOPHIL # BLD AUTO: 0.58 THOUSAND/ÂΜL (ref 0–0.61)
EOSINOPHIL NFR BLD AUTO: 7 % (ref 0–6)
ERYTHROCYTE [DISTWIDTH] IN BLOOD BY AUTOMATED COUNT: 12.5 % (ref 11.6–15.1)
GFR SERPL CREATININE-BSD FRML MDRD: 58 ML/MIN/1.73SQ M
GLUCOSE SERPL-MCNC: 106 MG/DL (ref 65–140)
HCT VFR BLD AUTO: 40.8 % (ref 34.8–46.1)
HGB BLD-MCNC: 13.3 G/DL (ref 11.5–15.4)
IMM GRANULOCYTES # BLD AUTO: 0.02 THOUSAND/UL (ref 0–0.2)
IMM GRANULOCYTES NFR BLD AUTO: 0 % (ref 0–2)
LYMPHOCYTES # BLD AUTO: 3.2 THOUSANDS/ÂΜL (ref 0.6–4.47)
LYMPHOCYTES NFR BLD AUTO: 40 % (ref 14–44)
MAGNESIUM SERPL-MCNC: 2.2 MG/DL (ref 1.9–2.7)
MCH RBC QN AUTO: 29.6 PG (ref 26.8–34.3)
MCHC RBC AUTO-ENTMCNC: 32.6 G/DL (ref 31.4–37.4)
MCV RBC AUTO: 91 FL (ref 82–98)
MONOCYTES # BLD AUTO: 0.66 THOUSAND/ÂΜL (ref 0.17–1.22)
MONOCYTES NFR BLD AUTO: 8 % (ref 4–12)
NEUTROPHILS # BLD AUTO: 3.56 THOUSANDS/ÂΜL (ref 1.85–7.62)
NEUTS SEG NFR BLD AUTO: 44 % (ref 43–75)
NRBC BLD AUTO-RTO: 0 /100 WBCS
PLATELET # BLD AUTO: 260 THOUSANDS/UL (ref 149–390)
PMV BLD AUTO: 8.7 FL (ref 8.9–12.7)
POTASSIUM SERPL-SCNC: 3.6 MMOL/L (ref 3.5–5.3)
PROT SERPL-MCNC: 7.6 G/DL (ref 6.4–8.4)
RBC # BLD AUTO: 4.49 MILLION/UL (ref 3.81–5.12)
SODIUM SERPL-SCNC: 139 MMOL/L (ref 135–147)
WBC # BLD AUTO: 8.1 THOUSAND/UL (ref 4.31–10.16)

## 2023-10-12 PROCEDURE — G0008 ADMIN INFLUENZA VIRUS VAC: HCPCS | Performed by: INTERNAL MEDICINE

## 2023-10-12 PROCEDURE — 99239 HOSP IP/OBS DSCHRG MGMT >30: CPT | Performed by: INTERNAL MEDICINE

## 2023-10-12 PROCEDURE — 80053 COMPREHEN METABOLIC PANEL: CPT | Performed by: INTERNAL MEDICINE

## 2023-10-12 PROCEDURE — 99232 SBSQ HOSP IP/OBS MODERATE 35: CPT | Performed by: INTERNAL MEDICINE

## 2023-10-12 PROCEDURE — 83735 ASSAY OF MAGNESIUM: CPT | Performed by: INTERNAL MEDICINE

## 2023-10-12 PROCEDURE — 85025 COMPLETE CBC W/AUTO DIFF WBC: CPT | Performed by: INTERNAL MEDICINE

## 2023-10-12 PROCEDURE — 99232 SBSQ HOSP IP/OBS MODERATE 35: CPT | Performed by: PHYSICIAN ASSISTANT

## 2023-10-12 PROCEDURE — 90662 IIV NO PRSV INCREASED AG IM: CPT | Performed by: INTERNAL MEDICINE

## 2023-10-12 RX ORDER — AMOXICILLIN AND CLAVULANATE POTASSIUM 875; 125 MG/1; MG/1
1 TABLET, FILM COATED ORAL EVERY 12 HOURS SCHEDULED
Status: DISCONTINUED | OUTPATIENT
Start: 2023-10-12 | End: 2023-10-12 | Stop reason: HOSPADM

## 2023-10-12 RX ORDER — AMOXICILLIN AND CLAVULANATE POTASSIUM 875; 125 MG/1; MG/1
1 TABLET, FILM COATED ORAL EVERY 12 HOURS SCHEDULED
Qty: 8 TABLET | Refills: 0 | Status: SHIPPED | OUTPATIENT
Start: 2023-10-12 | End: 2023-10-16

## 2023-10-12 RX ADMIN — AMOXICILLIN AND CLAVULANATE POTASSIUM 1 TABLET: 875; 125 TABLET, FILM COATED ORAL at 14:37

## 2023-10-12 RX ADMIN — PIPERACILLIN AND TAZOBACTAM 3.38 G: 36; 4.5 INJECTION, POWDER, FOR SOLUTION INTRAVENOUS at 07:51

## 2023-10-12 RX ADMIN — PIPERACILLIN AND TAZOBACTAM 3.38 G: 36; 4.5 INJECTION, POWDER, FOR SOLUTION INTRAVENOUS at 01:20

## 2023-10-12 RX ADMIN — INFLUENZA A VIRUS A/VICTORIA/4897/2022 IVR-238 (H1N1) ANTIGEN (FORMALDEHYDE INACTIVATED), INFLUENZA A VIRUS A/DARWIN/9/2021 SAN-010 (H3N2) ANTIGEN (FORMALDEHYDE INACTIVATED), INFLUENZA B VIRUS B/PHUKET/3073/2013 ANTIGEN (FORMALDEHYDE INACTIVATED), AND INFLUENZA B VIRUS B/MICHIGAN/01/2021 ANTIGEN (FORMALDEHYDE INACTIVATED) 0.7 ML: 60; 60; 60; 60 INJECTION, SUSPENSION INTRAMUSCULAR at 12:28

## 2023-10-12 NOTE — ASSESSMENT & PLAN NOTE
Px after abdominal pain and abnormal findings of CT colonoscopy diagnostic.  Concern for rectal perf vs tear after receiving contrast enema  CT showing extensive perianal subcutaneous emphysema now extending along the anterior abdominal wall and into the retroperitoneal space    ED discussed cased with surgery who recommended slim admission with GI consultation  Surgery and GI reccs appreciated  Per surgery continue with conservation management  ADAT - now on clear liquid diet  Pain management  Serial abdominal exam  IV Abx changed over to augmentin

## 2023-10-12 NOTE — CASE MANAGEMENT
Case Management Discharge Planning Note    Patient name Graham Fontenot  Location /-33 MRN [id number]  : 1935 Date 10/12/2023       Current Admission Date: 10/9/2023  Current Admission Diagnosis:Perirectal abscess   Patient Active Problem List    Diagnosis Date Noted    Perirectal abscess 10/12/2023    Class 1 obesity due to excess calories with serious comorbidity and body mass index (BMI) of 32.0 to 32.9 in adult 10/11/2023    Anxiety 10/09/2023    Rectal perforation ([address]) 10/09/2023    Pelvic organ prolapse quantification stage 1 cystocele 2018    Bladder cancer ([address]) 2016    Rectocele 2016    Chronic constipation 2016    Hypercholesteremia 2016      LOS (days): 2  Geometric Mean LOS (GMLOS) (days): 4.40  Days to GMLOS:2.4     OBJECTIVE:  Risk of Unplanned Readmission Score: 7.29     Current admission status: Inpatient   Preferred Pharmacy:   [address] [place] [address] #[id number] [address]  Phone: [phone] Fax: [phone]    Primary Care Provider: Fredis Rivera DO  Primary Insurance: MEDICARE  Secondary Insurance: [facility] DETAILS:    Discharge planning discussed with[de-identified] Patient & friend at bedside. Freedom of Choice: Yes  Comments - Freedom of Choice: FOC maintained - CM introduced self and role. AMPA of 19 reviewed. Patient denies any current needs. Informed CM will remain available through to d/c for needs.   CM contacted family/caregiver?: Yes (friend)  Were Treatment Team discharge recommendations reviewed with patient/caregiver?: Yes (As it pertains to d/c planning and CM role.)  Did patient/caregiver verbalize understanding of patient care needs?: Yes (As it pertains to d/c planning and CM role.)  Were patient/caregiver advised of the risks associated with not following Treatment Team discharge recommendations?: Yes (As it pertains to d/c planning and CM role.)    Contacts  Patient Contacts: Gisele Hodgkin (friend)  Relationship to Patient[de-identified] Friend  Contact Method: In Person  Reason/Outcome: Continuity of Care, Discharge [address]         Is the patient interested in [address] [facility] at discharge?: No    DME Referral Provided  Referral made for DME?: No    Other Referral/Resources/Interventions Provided:  Interventions: None Indicated    Would you like to participate in our [address] C$ cMoney service program?  : No - Declined    Treatment Team Recommendation: Home  Discharge Destination Plan[de-identified] Home  Transport at Discharge : Family     ETA of Transport (Date): 10/12/23     Transfer Mode: Self  Accompanied by: Jannet Duncan Given (Date):: 10/12/23  IMM Given to[de-identified] Patient     Additional Comments: Verbal review of IMM and Medicare rights as they pertain to d/c planning. Patient reports understanding and offers no questions or concerns, in agreement w/ current d/c plan. Patient copy provided to bedside. Original to medical records bin on unit.

## 2023-10-12 NOTE — PROGRESS NOTES
Progress Note - General Surgery   Jordan Navarro 80 y.o. female MRN: [id number]  Unit/Bed#: -01 Encounter: [id number]    Assessment/Plan  Jordan Navarro is a 80 y.o. female     Soft tissue gas 2/2 rectal trauma s/p CT colonoscopy   AVSS, WBC 8.10, Hb 13.3  Tolerated clear liquids, no n/v or abdominal pain, + flatus, abdomen soft, nondistended, nontender to palpation    Continue current care, advance diet as tolerated to soft low residue diet. Recommend that patient remain on a low residue diet for 2 weeks given evidence of rectal injury. If patient is able to tolerate diet advancement, ok for discharge home from surgical perspective on a low residue diet with follow up in 2 weeks in office   IV Zosyn for antimicrobial coverage, recommend transition to Augmentin once tolerating solid food to complete a 7 day course of antibiotics   Trend labs, monitor Hb, WBC, and vitals   I/Os  Serial abdominal exams  Encourage ambulation/OOB  Pain control/Antiemetics PRN  IS 10x an hour  DVT prophylaxis   SLIM primary. General surgery will sign off at this time. Please contact if any further questions or concerns arise       Subjective/Objective    Subjective: No acute events overnight, feeling well     Objective:     Blood pressure 146/75, pulse 73, temperature 97.9 °F (36.6 °C), temperature source Oral, resp. rate 18, height 5' 4.5" (1.638 m), weight 87 kg (191 lb 12.8 oz), SpO2 98 %, not currently breastfeeding. ,Body mass index is 32.41 kg/m².     No intake or output data in the 24 hours ending 10/12/23 0947    Invasive Devices       Peripheral Intravenous Line  Duration             Peripheral IV 10/11/23 Distal;Left;Ventral (anterior) Forearm 1 day                    Physical Exam: /75 (BP Location: Left arm)   Pulse 73   Temp 97.9 °F (36.6 °C) (Oral)   Resp 18   Ht 5' 4.5" (1.638 m)   Wt 87 kg (191 lb 12.8 oz)   SpO2 98%   BMI 32.41 kg/m²   General appearance: alert and oriented, in no acute distress  Lungs: clear to auscultation bilaterally  Heart: regular rate and rhythm, S1, S2 normal, no murmur, click, rub or gallop  Abdomen: soft, non-tender; bowel sounds normal; no masses,  no organomegaly  Extremities: extremities normal, warm and well-perfused; no cyanosis, clubbing, or edema    Lab, Imaging and other studies:I have personally reviewed pertinent lab results.      VTE Pharmacologic Prophylaxis: Sequential compression device (Venodyne)   VTE Mechanical Prophylaxis: sequential compression device    Recent Results (from the past 36 hour(s))   Comprehensive metabolic panel    Collection Time: 10/11/23  6:20 AM   Result Value Ref Range    Sodium 139 135 - 147 mmol/L    Potassium 3.6 3.5 - 5.3 mmol/L    Chloride 106 96 - 108 mmol/L    CO2 23 21 - 32 mmol/L    ANION GAP 10 mmol/L    BUN 10 5 - 25 mg/dL    Creatinine 0.87 0.60 - 1.30 mg/dL    Glucose 106 65 - 140 mg/dL    Calcium 8.8 8.4 - 10.2 mg/dL    AST 35 13 - 39 U/L    ALT 12 7 - 52 U/L    Alkaline Phosphatase 52 34 - 104 U/L    Total Protein 6.9 6.4 - 8.4 g/dL    Albumin 3.6 3.5 - 5.0 g/dL    Total Bilirubin 1.04 (H) 0.20 - 1.00 mg/dL    eGFR 59 ml/min/1.73sq m   CBC and differential    Collection Time: 10/11/23  6:20 AM   Result Value Ref Range    WBC 7.83 4.31 - 10.16 Thousand/uL    RBC 4.37 3.81 - 5.12 Million/uL    Hemoglobin 12.8 11.5 - 15.4 g/dL    Hematocrit 39.6 34.8 - 46.1 %    MCV 91 82 - 98 fL    MCH 29.3 26.8 - 34.3 pg    MCHC 32.3 31.4 - 37.4 g/dL    RDW 12.6 11.6 - 15.1 %    MPV 8.4 (L) 8.9 - 12.7 fL    Platelets 911 302 - 810 Thousands/uL    nRBC 0 /100 WBCs    Neutrophils Relative 45 43 - 75 %    Immat GRANS % 0 0 - 2 %    Lymphocytes Relative 37 14 - 44 %    Monocytes Relative 10 4 - 12 %    Eosinophils Relative 7 (H) 0 - 6 %    Basophils Relative 1 0 - 1 %    Neutrophils Absolute 3.55 1.85 - 7.62 Thousands/µL    Immature Grans Absolute 0.02 0.00 - 0.20 Thousand/uL    Lymphocytes Absolute 2.87 0.60 - 4.47 Thousands/µL    Monocytes Absolute 0.75 0.17 - 1.22 Thousand/µL    Eosinophils Absolute 0.54 0.00 - 0.61 Thousand/µL    Basophils Absolute 0.10 0.00 - 0.10 Thousands/µL   Magnesium    Collection Time: 10/11/23  6:20 AM   Result Value Ref Range    Magnesium 2.0 1.9 - 2.7 mg/dL   CBC and differential    Collection Time: 10/12/23  4:45 AM   Result Value Ref Range    WBC 8.10 4.31 - 10.16 Thousand/uL    RBC 4.49 3.81 - 5.12 Million/uL    Hemoglobin 13.3 11.5 - 15.4 g/dL    Hematocrit 40.8 34.8 - 46.1 %    MCV 91 82 - 98 fL    MCH 29.6 26.8 - 34.3 pg    MCHC 32.6 31.4 - 37.4 g/dL    RDW 12.5 11.6 - 15.1 %    MPV 8.7 (L) 8.9 - 12.7 fL    Platelets 333 684 - 961 Thousands/uL    nRBC 0 /100 WBCs    Neutrophils Relative 44 43 - 75 %    Immat GRANS % 0 0 - 2 %    Lymphocytes Relative 40 14 - 44 %    Monocytes Relative 8 4 - 12 %    Eosinophils Relative 7 (H) 0 - 6 %    Basophils Relative 1 0 - 1 %    Neutrophils Absolute 3.56 1.85 - 7.62 Thousands/µL    Immature Grans Absolute 0.02 0.00 - 0.20 Thousand/uL    Lymphocytes Absolute 3.20 0.60 - 4.47 Thousands/µL    Monocytes Absolute 0.66 0.17 - 1.22 Thousand/µL    Eosinophils Absolute 0.58 0.00 - 0.61 Thousand/µL    Basophils Absolute 0.08 0.00 - 0.10 Thousands/µL   Comprehensive metabolic panel    Collection Time: 10/12/23  4:45 AM   Result Value Ref Range    Sodium 139 135 - 147 mmol/L    Potassium 3.6 3.5 - 5.3 mmol/L    Chloride 106 96 - 108 mmol/L    CO2 25 21 - 32 mmol/L    ANION GAP 8 mmol/L    BUN 7 5 - 25 mg/dL    Creatinine 0.89 0.60 - 1.30 mg/dL    Glucose 106 65 - 140 mg/dL    Calcium 9.0 8.4 - 10.2 mg/dL    AST 45 (H) 13 - 39 U/L    ALT 16 7 - 52 U/L    Alkaline Phosphatase 55 34 - 104 U/L    Total Protein 7.6 6.4 - 8.4 g/dL    Albumin 4.0 3.5 - 5.0 g/dL    Total Bilirubin 0.97 0.20 - 1.00 mg/dL    eGFR 58 ml/min/1.73sq m   Magnesium    Collection Time: 10/12/23  4:45 AM   Result Value Ref Range    Magnesium 2.2 1.9 - 2.7 mg/dL

## 2023-10-12 NOTE — PLAN OF CARE
Problem: PAIN - ADULT  Goal: Verbalizes/displays adequate comfort level or baseline comfort level  Description: Interventions:  - Encourage patient to monitor pain and request assistance  - Assess pain using appropriate pain scale  - Administer analgesics based on type and severity of pain and evaluate response  - Implement non-pharmacological measures as appropriate and evaluate response  - Consider cultural and social influences on pain and pain management  - Notify physician/advanced practitioner if interventions unsuccessful or patient reports new pain  Outcome: Progressing     Problem: INFECTION - ADULT  Goal: Absence or prevention of progression during hospitalization  Description: INTERVENTIONS:  - Assess and monitor for signs and symptoms of infection  - Monitor lab/diagnostic results  - Monitor all insertion sites, i.e. indwelling lines, tubes, and drains  - Monitor endotracheal if appropriate and nasal secretions for changes in amount and color  - [place] appropriate cooling/warming therapies per order  - Administer medications as ordered  - Instruct and encourage patient and family to use good hand hygiene technique  - Identify and instruct in appropriate isolation precautions for identified infection/condition  Outcome: Progressing  Goal: Absence of fever/infection during neutropenic period  Description: INTERVENTIONS:  - Monitor WBC    Outcome: Progressing     Problem: SAFETY ADULT  Goal: Patient will remain free of falls  Description: INTERVENTIONS:  - Educate patient/family on patient safety including physical limitations  - Instruct patient to call for assistance with activity   - Consult OT/PT to assist with strengthening/mobility   - Keep Call bell within reach  - Keep bed low and locked with side rails adjusted as appropriate  - Keep care items and personal belongings within reach  - Initiate and maintain comfort rounds  - Make Fall Risk Sign visible to staff  - Offer Toileting every Hours, in advance of need  - Initiate/Maintain alarm  - Obtain necessary fall risk management equipment:   - Apply yellow socks and bracelet for high fall risk patients  - Consider moving patient to room near nurses station  Outcome: Progressing  Goal: Maintain or return to baseline ADL function  Description: INTERVENTIONS:  -  Assess patient's ability to carry out ADLs; assess patient's baseline for ADL function and identify physical deficits which impact ability to perform ADLs (bathing, care of mouth/teeth, toileting, grooming, dressing, etc.)  - Assess/evaluate cause of self-care deficits   - Assess range of motion  - Assess patient's mobility; develop plan if impaired  - Assess patient's need for assistive devices and provide as appropriate  - Encourage maximum independence but intervene and supervise when necessary  - Involve family in performance of ADLs  - Assess for home care needs following discharge   - Consider OT consult to assist with ADL evaluation and planning for discharge  - Provide patient education as appropriate  Outcome: Progressing  Goal: Maintains/Returns to pre admission functional level  Description: INTERVENTIONS:  - Perform BMAT or MOVE assessment daily.   - Set and communicate daily mobility goal to care team and patient/family/caregiver. - Collaborate with rehabilitation services on mobility goals if consulted  - Perform Range of Motion times a day. - Reposition patient every hours.   - Dangle patient times a day  - Stand patient  times a day  - Ambulate patient times a day  - Out of bed to chair imes a day   - Out of bed for meals times a day  - Out of bed for toileting  - Record patient progress and toleration of activity level   Outcome: Progressing     Problem: DISCHARGE PLANNING  Goal: Discharge to home or other facility with appropriate resources  Description: INTERVENTIONS:  - Identify barriers to discharge w/patient and caregiver  - Arrange for needed discharge resources and transportation as appropriate  - Identify discharge learning needs (meds, wound care, etc.)  - Arrange for interpretive services to assist at discharge as needed  - Refer to Case Management Department for coordinating discharge planning if the patient needs post-hospital services based on physician/advanced practitioner order or complex needs related to functional status, cognitive ability, or social support system  Outcome: Progressing     Problem: Knowledge Deficit  Goal: Patient/family/caregiver demonstrates understanding of disease process, treatment plan, medications, and discharge instructions  Description: Complete learning assessment and assess knowledge base.   Interventions:  - Provide teaching at level of understanding  - Provide teaching via preferred learning methods  Outcome: Progressing     Problem: Prexisting or High Potential for Compromised Skin Integrity  Goal: Skin integrity is maintained or improved  Description: INTERVENTIONS:  - Identify patients at risk for skin breakdown  - Assess and monitor skin integrity  - Assess and monitor nutrition and hydration status  - Monitor labs   - Assess for incontinence   - Turn and reposition patient  - Assist with mobility/ambulation  - Relieve pressure over bony prominences  - Avoid friction and shearing  - Provide appropriate hygiene as needed including keeping skin clean and dry  - Evaluate need for skin moisturizer/barrier cream  - Collaborate with interdisciplinary team   - Patient/family teaching  - Consider wound care consult   Outcome: Progressing

## 2023-10-12 NOTE — ASSESSMENT & PLAN NOTE
Body mass index is 32.41 kg/m².     Recommend incorporating a more whole foods plant-predominant diet along with decreasing consumption of red meats and processed foods

## 2023-10-12 NOTE — CASE MANAGEMENT
Case Management Assessment    Patient name Darci Hays  Location /-36 MRN [id number]  : 1935 Date 10/12/2023       Current Admission Date: 10/9/2023  Current Admission Diagnosis:Perirectal abscess   Patient Active Problem List    Diagnosis Date Noted    Perirectal abscess 10/12/2023    Class 1 obesity due to excess calories with serious comorbidity and body mass index (BMI) of 32.0 to 32.9 in adult 10/11/2023    Anxiety 10/09/2023    Rectal perforation ([address]) 10/09/2023    Pelvic organ prolapse quantification stage 1 cystocele 2018    Bladder cancer ([address]) 2016    Rectocele 2016    Chronic constipation 2016    Hypercholesteremia 2016      LOS (days): 2  Geometric Mean LOS (GMLOS) (days): 4.40  Days to GMLOS:2.5     OBJECTIVE:    Risk of Unplanned Readmission Score: 7.27      Current admission status: Inpatient     Preferred Pharmacy:   [address] [place] [address] #[id number] [place] [id number]  Phone: [phone] Fax: [phone]    Primary Care Provider: Cyndi Breaux DO  Primary Insurance: MEDICARE  Secondary Insurance: [address]:  [facility] Proxies    There are no active Health Care Proxies on file. Readmission Root Cause  30 Day Readmission: No    Patient Information  Admitted from[de-identified] Home  Mental Status: Alert  During Assessment patient was accompanied by: Other-Comment (SO)  Assessment information provided by[de-identified] Patient  Primary Caregiver: Self  Support Systems: Spouse/significant other, Self  [place] of Residence: [address] [place] [address] do you live in?: [address] entry access options.  Select all that apply.: Stairs  Number of steps to enter home.: 3 (3 MAGUI front w/o HR, 5STE w/ HR in back.)  Do the steps have railings?: No  Type of Current Residence: [facility]  In the last 12 months, was there a time when you were not able to pay the mortgage or rent on time?: No  In the last 12 months, how many places have you lived?: 1  In the last 12 months, was there a time when you did not have a steady place to sleep or slept in a shelter (including now)?: No  Homeless/housing insecurity resource given?: N/A  Living Arrangements: Lives w/ Spouse/significant other  Is patient a ?: No    Activities of Daily Living Prior to Admission  Functional Status: Independent  Completes ADLs independently?: Yes  Ambulates independently?: Yes  Does patient use assisted devices?: No  Does patient currently own DME?: Yes  What DME does the patient currently own?: Wheelchair, Melissa Montano, Bedside Commode  Does patient have a history of Outpatient Therapy (PT/OT)?: No  Does patient currently have [address]?: No    Patient Information Continued  Income Source: Pension/half-way  Does patient have prescription coverage?: Yes  Within the past 12 months, you worried that your food would run out before you got the money to buy more.: Never true  Within the past 12 months, the food you bought just didn't last and you didn't have money to get more.: Never true  Food insecurity resource given?: N/A  Does patient receive dialysis treatments?: No  Does patient have a history of substance abuse?: No  Does patient have a history of Mental Health Diagnosis?: No    PHQ 2/9 Screening   Reviewed PHQ 2/9 Depression Screening Score?: No    Means of Transportation  Means of Transport to hospitals[de-identified] Family transport (Family or neighbors.)  In the past 12 months, has lack of transportation kept you from medical appointments or from getting medications?: No  In the past 12 months, has lack of transportation kept you from meetings, work, or from getting things needed for daily living?: No  Was application for public transport provided?: N/A

## 2023-10-12 NOTE — DISCHARGE SUMMARY
[address]  Discharge- Kristen Velasquez 1935, 80 y.o. female MRN: [id number]  Unit/Bed#: -[facility] Encounter: [id number]  Primary Care Provider: Doroteo Lawrence DO   Date and time admitted to hospital: 10/9/2023  9:49 AM    Rectal perforation [facility])  Assessment & Plan  Px after abdominal pain and abnormal findings of CT colonoscopy diagnostic. Concern for rectal perf vs tear after receiving contrast enema  CT showing extensive perianal subcutaneous emphysema now extending along the anterior abdominal wall and into the retroperitoneal space    ED discussed cased with surgery who recommended slim admission with GI consultation  Surgery and GI reccs appreciated  Per surgery continue with conservation management  ADAT - now on clear liquid diet  Pain management  Serial abdominal exam  IV Abx changed over to augmentin    Class 1 obesity due to excess calories with serious comorbidity and body mass index (BMI) of 32.0 to 32.9 in adult  Assessment & Plan  Body mass index is 32.41 kg/m². Recommend incorporating a more whole foods plant-predominant diet along with decreasing consumption of red meats and processed foods    Hypercholesteremia  Assessment & Plan  Pt is on asa 81 mg daily and statin  Resume when able to take PO     Chronic constipation  Assessment & Plan  Home dose of miralax and senna once able to tolerate p.o.     Anxiety  Assessment & Plan  Resume home dose antidepressant when able to tolerate PO    * Perirectal abscess  Assessment & Plan  Continue Abx per gen surgery reccs for total of 7 days  Change IV abx to Augmentin            Medical Problems       Resolved Problems  Date Reviewed: 10/12/2023   None       Discharging Physician / Practitioner: Emerson Ellis DO  PCP: Doroteo Lawrence DO  Admission Date:   Admission Orders (From admission, onward)       Ordered        10/10/23 1317  Inpatient Admission  Once            10/09/23 1243  Place in Observation Once                          Discharge Date: 10/12/23    Consultations During Hospital Stay:  IP CONSULT TO ACUTE CARE SURGERY  IP CONSULT TO GASTROENTEROLOGY    Procedures Performed:   None    Significant Findings / Test Results:   CT abdomen pelvis with contrast    Result Date: 10/9/2023  Impression: Redemonstration of extensive perianal subcutaneous emphysema now extending along the anterior abdominal wall and into the retroperitoneal space. Workstation performed: [id number]     CT colonoscopy diagnostic wo contrast    Addendum Date: 10/9/2023    ADDENDUM: Please note the soft tissue air densities described in the original report involve the ischioanal fossa and extends to the overlying medial gluteal soft tissues. Result Date: 10/9/2023  Impression: 1. Moderate to large amount of air is seen within the soft tissues of the perianal fossa on prone images which was not present on the supine images acquired immediately prior. Findings most compatible with underlying injury/defect involving the wall of the anal canal. There is no intrapelvic free air or pneumoperitoneum. 2. No evidence of colonic polyp of 6 mm or greater in size within the confines of the study. There is sigmoid diverticulosis with incomplete distention of the sigmoid colon which does limit evaluation. 3. Cystic changes within the midpole of the left kidney which may represent abutting renal cyst versus mildly complex septated cyst. Nonemergent follow-up with renal ultrasound is recommended for further characterization 4. Previously identified pulmonary nodule measuring 7 mm within the right lower lobe is not included within the field-of-view on this exam. Please refer to prior CT abdomen pelvis report of 8/29/2023 for follow-up recommendations. Preliminary findings pertaining to air within the perianal soft tissues was discussed with Dr. Charles Phoenix by my colleague Dr. Cintia Parker shortly after image acquisition.  The patient was subsequently escorted to the emergency department. The study was marked in [facility] for immediate notification. Workstation performed: [id number]       No Chest XR results available for this patient. No results found for this or any previous visit. No results for input(s): "My Bless", "SPUTUMCULTUR", "Bonnetta Justice", "Shawn [place]", "WOUNDCULT", "BODYFLUIDCUL", "Lisa Distel", "INFLUAPCR", "INFLUBPCR", "RSVPCR", "Fady Bonny", "STPU", "CDIFFTOXINB" in the last 72 hours. Incidental Findings:   None other than noted above    Test Results Pending at Discharge (will require follow up): None     Outpatient Tests Requested:  None    Complications:  None    Reason for Admission:   Chief Complaint   Patient presents with    Evaluation of Abnormal Diagnostic Test     Abnormal CT colonoscopy done. Hospital Course:   Gavin Maynard is a 80 y.o. female patient who originally presented to the hospital on 10/9/2023 due to receiving contrast enema and developing an IR during imaging for which she was sent to the ED for further work-up. Have been having alternate episodes of constipation and diarrhea. There was also noted concern for rectal tear and perforation upon evaluation in the ED. Admitted for further work-up. Imaging obtained showing results noted above. Neurology and general surgery were consulted and recommended for overall concern and management with no indication for surgical intervention. Patient had minimal abdominal pain and was evaluated throughout the duration of the admission on antibiotics with IV. Ultimately, patient was able to tolerate p.o. diet and was deemed stable for discharge by general surgery and gastroenterology team.  Recommended completion of antibiotic course for 7 days total of antibiotics with p.o. Augmentin on discharge.  present at bedside and all questions answered. Recommended follow-up with outpatient PCP and outpatient gastroenterology within 1 to 2 weeks after discharge.   All questions answered      Please see above list of diagnoses and related plan for additional information. Condition at Discharge: stable    Discharge Day Visit / Exam:   Subjective: Offers no was today. Able to tolerate p.o. diet. All questions answered. .   present at bedside. Vitals: Blood Pressure: 146/75 (10/12/23 0731)  Pulse: 73 (10/12/23 0731)  Temperature: 97.9 °F (36.6 °C) (10/12/23 0731)  Temp Source: Oral (10/12/23 0731)  Respirations: 18 (10/12/23 0731)  Height: 5' 4.5" (163.8 cm) (10/09/23 1738)  Weight - Scale: 87 kg (191 lb 12.8 oz) (10/09/23 1738)  SpO2: 98 % (10/12/23 0828)  Exam:   Physical Exam  Vitals and nursing note reviewed. Constitutional:       General: She is not in acute distress. Appearance: She is well-developed. She is not diaphoretic. HENT:      Head: Normocephalic and atraumatic. Nose: Nose normal.      Mouth/Throat:      Pharynx: No oropharyngeal exudate. Eyes:      General: No scleral icterus. Right eye: No discharge. Left eye: No discharge. Conjunctiva/sclera: Conjunctivae normal.   Cardiovascular:      Rate and Rhythm: Normal rate and regular rhythm. Heart sounds: Normal heart sounds. No murmur heard. No friction rub. No gallop. Pulmonary:      Effort: Pulmonary effort is normal. No respiratory distress. Breath sounds: Normal breath sounds. No wheezing or rales. Abdominal:      General: Bowel sounds are normal. There is distension. Palpations: Abdomen is soft. Tenderness: There is abdominal tenderness. There is no guarding or rebound. Musculoskeletal:         General: Normal range of motion. Cervical back: Normal range of motion and neck supple. Skin:     General: Skin is warm and dry. Findings: No erythema. Neurological:      Mental Status: She is alert and oriented to person, place, and time.    Psychiatric:         Behavior: Behavior normal.          Discussion with Family: Updated  () at bedside. Discharge instructions/Information to patient and family:   See after visit summary for information provided to patient and family. Provisions for Follow-Up Care:  See after visit summary for information related to follow-up care and any pertinent home health orders. Disposition:   Home    Planned Readmission: none      Discharge Statement:  I spent 49 minutes discharging the patient. This time was spent on the day of discharge. I had direct contact with the patient on the day of discharge. Greater than 50% of the total time was spent examining patient, answering all patient questions, arranging and discussing plan of care with patient as well as directly providing post-discharge instructions. Additional time then spent on discharge activities. Discharge Medications:  See after visit summary for reconciled discharge medications provided to patient and/or family.       **Please Note: This note may have been constructed using a voice recognition system**

## 2023-10-12 NOTE — PLAN OF CARE
Problem: PAIN - ADULT  Goal: Verbalizes/displays adequate comfort level or baseline comfort level  Description: Interventions:  - Encourage patient to monitor pain and request assistance  - Assess pain using appropriate pain scale  - Administer analgesics based on type and severity of pain and evaluate response  - Implement non-pharmacological measures as appropriate and evaluate response  - Consider cultural and social influences on pain and pain management  - Notify physician/advanced practitioner if interventions unsuccessful or patient reports new pain  Outcome: Progressing     Problem: INFECTION - ADULT  Goal: Absence or prevention of progression during hospitalization  Description: INTERVENTIONS:  - Assess and monitor for signs and symptoms of infection  - Monitor lab/diagnostic results  - Monitor all insertion sites, i.e. indwelling lines, tubes, and drains  - Monitor endotracheal if appropriate and nasal secretions for changes in amount and color  - [place] appropriate cooling/warming therapies per order  - Administer medications as ordered  - Instruct and encourage patient and family to use good hand hygiene technique  - Identify and instruct in appropriate isolation precautions for identified infection/condition  Outcome: Progressing  Goal: Absence of fever/infection during neutropenic period  Description: INTERVENTIONS:  - Monitor WBC    Outcome: Progressing     Problem: SAFETY ADULT  Goal: Patient will remain free of falls  Description: INTERVENTIONS:  - Educate patient/family on patient safety including physical limitations  - Instruct patient to call for assistance with activity   - Consult OT/PT to assist with strengthening/mobility   - Keep Call bell within reach  - Keep bed low and locked with side rails adjusted as appropriate  - Keep care items and personal belongings within reach  - Initiate and maintain comfort rounds  - Make Fall Risk Sign visible to staff  - Offer Toileting every 2 Hours, in advance of need  - Initiate/Maintain 2alarm  - Obtain necessary fall risk management equipment: 2  - Apply yellow socks and bracelet for high fall risk patients  - Consider moving patient to room near nurses station  Outcome: Progressing  Goal: Maintain or return to baseline ADL function  Description: INTERVENTIONS:  -  Assess patient's ability to carry out ADLs; assess patient's baseline for ADL function and identify physical deficits which impact ability to perform ADLs (bathing, care of mouth/teeth, toileting, grooming, dressing, etc.)  - Assess/evaluate cause of self-care deficits   - Assess range of motion  - Assess patient's mobility; develop plan if impaired  - Assess patient's need for assistive devices and provide as appropriate  - Encourage maximum independence but intervene and supervise when necessary  - Involve family in performance of ADLs  - Assess for home care needs following discharge   - Consider OT consult to assist with ADL evaluation and planning for discharge  - Provide patient education as appropriate  Outcome: Progressing  Goal: Maintains/Returns to pre admission functional level  Description: INTERVENTIONS:  - Perform BMAT or MOVE assessment daily.   - Set and communicate daily mobility goal to care team and patient/family/caregiver. - Collaborate with rehabilitation services on mobility goals if consulted  - Perform Range of Motion 2 times a day. - Reposition patient every 2 hours.   - Dangle patient 2 times a day  - Stand patient 2 times a day  - Ambulate patient 2 times a day  - Out of bed to chair 2 times a day   - Out of bed for meals 2 times a day  - Out of bed for toileting  - Record patient progress and toleration of activity level   Outcome: Progressing     Problem: DISCHARGE PLANNING  Goal: Discharge to home or other facility with appropriate resources  Description: INTERVENTIONS:  - Identify barriers to discharge w/patient and caregiver  - Arrange for needed discharge resources and transportation as appropriate  - Identify discharge learning needs (meds, wound care, etc.)  - Arrange for interpretive services to assist at discharge as needed  - Refer to Case Management Department for coordinating discharge planning if the patient needs post-hospital services based on physician/advanced practitioner order or complex needs related to functional status, cognitive ability, or social support system  Outcome: Progressing     Problem: Knowledge Deficit  Goal: Patient/family/caregiver demonstrates understanding of disease process, treatment plan, medications, and discharge instructions  Description: Complete learning assessment and assess knowledge base.   Interventions:  - Provide teaching at level of understanding  - Provide teaching via preferred learning methods  Outcome: Progressing     Problem: Prexisting or High Potential for Compromised Skin Integrity  Goal: Skin integrity is maintained or improved  Description: INTERVENTIONS:  - Identify patients at risk for skin breakdown  - Assess and monitor skin integrity  - Assess and monitor nutrition and hydration status  - Monitor labs   - Assess for incontinence   - Turn and reposition patient  - Assist with mobility/ambulation  - Relieve pressure over bony prominences  - Avoid friction and shearing  - Provide appropriate hygiene as needed including keeping skin clean and dry  - Evaluate need for skin moisturizer/barrier cream  - Collaborate with interdisciplinary team   - Patient/family teaching  - Consider wound care consult   Outcome: Progressing     Problem: GASTROINTESTINAL - ADULT  Goal: Maintains or returns to baseline bowel function  Description: INTERVENTIONS:  - Assess bowel function  - Encourage oral fluids to ensure adequate hydration  - Administer IV fluids if ordered to ensure adequate hydration  - Administer ordered medications as needed  - Encourage mobilization and activity  - Consider nutritional services referral to assist patient with adequate nutrition and appropriate food choices  Outcome: Progressing     Problem: GENITOURINARY - ADULT  Goal: Maintains or returns to baseline urinary function  Description: INTERVENTIONS:  - Assess urinary function  - Encourage oral fluids to ensure adequate hydration if ordered  - Administer IV fluids as ordered to ensure adequate hydration  - Administer ordered medications as needed  - Offer frequent toileting  - Follow urinary retention protocol if ordered  Outcome: Progressing

## 2023-10-12 NOTE — PROGRESS NOTES
Progress Note - Graham Fontenot 80 y.o. female MRN: [id number]    Unit/Bed#: -[facility] Encounter: [id number]        Subjective:     Patient reports she feels well. She reports looking forward to eating solid food. Tolerated clear liquids. Continues to deny fever, abdominal pain, nausea or vomiting. ROS: As noted in the HPI, otherwise all others negative. Objective:     Vitals: Blood pressure 146/75, pulse 73, temperature 97.9 °F (36.6 °C), temperature source Oral, resp. rate 18, height 5' 4.5" (1.638 m), weight 87 kg (191 lb 12.8 oz), SpO2 98 %, not currently breastfeeding. ,Body mass index is 32.41 kg/m². Intake/Output Summary (Last 24 hours) at 10/12/2023 1104  Last data filed at 10/12/2023 0900  Gross per 24 hour   Intake 240 ml   Output --   Net 240 ml       Physical Exam:     General Appearance: Alert and oriented x 3. In no respiratory distress  Lungs: Clear to auscultation bilaterally, no rales or rhonchi  Heart: Regular rate and rhythm, S1, S2 normal, no murmur, click, rub or gallop  Abdomen: Soft, non-tender, non-distended; bowel sounds normal; no masses or no organomegaly  Extremities: No cyanosis, edema    Invasive Devices       Peripheral Intravenous Line  Duration             Peripheral IV 10/11/23 Distal;Left;Ventral (anterior) Forearm 1 day                    Lab Results:  Results from last 7 days   Lab Units 10/12/23  0445   WBC Thousand/uL 8.10   HEMOGLOBIN g/dL 13.3   HEMATOCRIT % 40.8   PLATELETS Thousands/uL 260   NEUTROS PCT % 44   LYMPHS PCT % 40   MONOS PCT % 8   EOS PCT % 7*     Results from last 7 days   Lab Units 10/12/23  0445   POTASSIUM mmol/L 3.6   CHLORIDE mmol/L 106   CO2 mmol/L 25   BUN mg/dL 7   CREATININE mg/dL 0.89   CALCIUM mg/dL 9.0   ALK PHOS U/L 55   ALT U/L 16   AST U/L 45*               Imaging Studies: I have personally reviewed pertinent imaging studies.     CT abdomen pelvis with contrast    Result Date: 10/9/2023  Impression: Redemonstration of extensive perianal subcutaneous emphysema now extending along the anterior abdominal wall and into the retroperitoneal space. Workstation performed: [id number]     CT colonoscopy diagnostic wo contrast    Addendum Date: 10/9/2023    ADDENDUM: Please note the soft tissue air densities described in the original report involve the ischioanal fossa and extends to the overlying medial gluteal soft tissues. Result Date: 10/9/2023  Impression: 1. Moderate to large amount of air is seen within the soft tissues of the perianal fossa on prone images which was not present on the supine images acquired immediately prior. Findings most compatible with underlying injury/defect involving the wall of the anal canal. There is no intrapelvic free air or pneumoperitoneum. 2. No evidence of colonic polyp of 6 mm or greater in size within the confines of the study. There is sigmoid diverticulosis with incomplete distention of the sigmoid colon which does limit evaluation. 3. Cystic changes within the midpole of the left kidney which may represent abutting renal cyst versus mildly complex septated cyst. Nonemergent follow-up with renal ultrasound is recommended for further characterization 4. Previously identified pulmonary nodule measuring 7 mm within the right lower lobe is not included within the field-of-view on this exam. Please refer to prior CT abdomen pelvis report of 8/29/2023 for follow-up recommendations. Preliminary findings pertaining to air within the perianal soft tissues was discussed with Dr. Racheal Crain by my colleague Dr. Bull Buckner shortly after image acquisition. The patient was subsequently escorted to the emergency department. The study was marked in [facility] for immediate notification.  Workstation performed: [id number]         Assessment and Plan:       1) Suspected rectal trauma with perforation post CT colonography, family history of colon cancer, rectal bleeding, and alternating bowel habits - Occurred following CT colonography on 10/9. Was ordered for rectal bleeding despite Anusol course and bowel regimen. Family history of colon cancer in patient's son who passed away at age 77. Patient transferred from the radiology department directly to the ED. Initial CT did not show "obvious focal defect" but findings compatible for injury/defect in the anal canal. Fortunately, her abdominal exam remains benign and soft. - Discussed with surgery, diet to be advanced. Patient likely nearing discharge. - IV Zosyn, will be discharged on oral antibiotics as an outpatient per surgery recommendations   - Serial abdominal exams  - CBC daily   - Continue to monitor hemodynamics      2) 2.3 cm left renal lesion, pulmonary nodules - Patient and patient's daughter in law at bedside not aware of finding. Will need to be followed up by a renal ultrasound after above. Again, CT chest as an outpatient as recommended by radiology in 6 months. Follow-up with PCP. Portions of the record may have been created with voice recognition software. Occasional wrong word or "sound a like" substitutions may have occurred due to the inherent limitations of voice recognition software. Read the chart carefully and recognize, using context, where substitutions have occurred.

## 2023-10-16 ENCOUNTER — TELEPHONE (OUTPATIENT)
Dept: GASTROENTEROLOGY | Facility: CLINIC | Age: 88
End: 2023-10-16

## 2023-10-16 NOTE — TELEPHONE ENCOUNTER
Left message to check on patient. Also reminded her that the surgery office will be calling her to schedule a follow-up. Wanted to hear how she was doing.

## 2023-10-27 ENCOUNTER — OFFICE VISIT (OUTPATIENT)
Dept: SURGERY | Facility: CLINIC | Age: 88
End: 2023-10-27
Payer: MEDICARE

## 2023-10-27 VITALS
RESPIRATION RATE: 12 BRPM | WEIGHT: 184.4 LBS | SYSTOLIC BLOOD PRESSURE: 121 MMHG | HEART RATE: 77 BPM | TEMPERATURE: 97.9 F | BODY MASS INDEX: 31.48 KG/M2 | DIASTOLIC BLOOD PRESSURE: 80 MMHG | OXYGEN SATURATION: 97 % | HEIGHT: 64 IN

## 2023-10-27 DIAGNOSIS — K63.1 RECTAL PERFORATION (HCC): Primary | ICD-10-CM

## 2023-10-27 PROCEDURE — 99213 OFFICE O/P EST LOW 20 MIN: CPT | Performed by: STUDENT IN AN ORGANIZED HEALTH CARE EDUCATION/TRAINING PROGRAM

## 2023-10-27 NOTE — PROGRESS NOTES
Assessment/Plan:  49-year-old female with extraperitoneal rectal perforation after CT colonoscopy  -Patient is tolerating a diet and having normal bowel function  -She denies any abdominal or perirectal pain  -On exam there is no erythema, crepitus, tenderness in the buttocks or perirectal areas  -Patient has completed her antibiotics  -At this time patient no longer requires any additional follow-up as it appears the area has healed and she offers no complaints at this time  - Follow up in office as needed     1. Rectal perforation (HCC)               Subjective:      Patient ID: Maureen Gutierrez is a 80 y.o. female. Triage Notes:    Patient is an 49-year-old female who presents to office for evaluation after hospital admission for extraperitoneal rectal perforation after CT colonoscopy. She states she is tolerating a diet and having normal bowel function. She denies any abdominal or perianal pain. She offers no complaints at this time. The following portions of the patient's history were reviewed and updated as appropriate: allergies, current medications, past family history, past medical history, past social history, past surgical history and problem list.    Review of Systems   Constitutional:  Negative for chills, fatigue and fever. HENT:  Negative for congestion, hearing loss, rhinorrhea and sore throat. Eyes:  Negative for pain and discharge. Respiratory:  Negative for cough, chest tightness and shortness of breath. Cardiovascular:  Negative for chest pain and palpitations. Gastrointestinal:  Negative for abdominal pain, constipation, diarrhea, nausea and vomiting. Endocrine: Negative for cold intolerance and heat intolerance. Genitourinary:  Negative for difficulty urinating and dysuria. Musculoskeletal:  Negative for back pain and neck pain. Skin:  Negative for color change and rash. Allergic/Immunologic: Negative for environmental allergies and food allergies. Neurological:  Negative for seizures and headaches. Hematological:  Negative for adenopathy. Does not bruise/bleed easily. Psychiatric/Behavioral:  Negative for confusion and hallucinations. Objective:      /80 (BP Location: Left arm, Patient Position: Sitting, Cuff Size: Standard)   Pulse 77   Temp 97.9 °F (36.6 °C) (Temporal)   Resp 12   Ht 5' 4" (1.626 m)   Wt 83.6 kg (184 lb 6.4 oz)   SpO2 97%   BMI 31.65 kg/m²     Below is the patient's most recent value for Albumin, ALT, AST, BUN, Calcium, Chloride, Cholesterol, CO2, Creatinine, GFR, Glucose, HDL, Hematocrit, Hemoglobin, Hemoglobin A1C, LDL, Magnesium, Phosphorus, Platelets, Potassium, PSA, Sodium, Triglycerides, and WBC. Lab Results   Component Value Date    ALT 16 10/12/2023    AST 45 (H) 10/12/2023    BUN 7 10/12/2023    CALCIUM 9.0 10/12/2023     10/12/2023    CO2 25 10/12/2023    CREATININE 0.89 10/12/2023    HCT 40.8 10/12/2023    HGB 13.3 10/12/2023    HGBA1C 6.2 (H) 09/24/2019    MG 2.2 10/12/2023     10/12/2023    K 3.6 10/12/2023    WBC 8.10 10/12/2023     Note: for a comprehensive list of the patient's lab results, access the Results Review activity. Physical Exam  Exam conducted with a chaperone present (KENNETH Gan). Constitutional:       Appearance: Normal appearance. HENT:      Head: Normocephalic and atraumatic. Nose: Nose normal.   Eyes:      General: No scleral icterus. Conjunctiva/sclera: Conjunctivae normal.   Cardiovascular:      Rate and Rhythm: Normal rate. Pulmonary:      Effort: Pulmonary effort is normal.   Abdominal:      General: There is no distension. Palpations: Abdomen is soft. Tenderness: There is no abdominal tenderness. Comments: No tenderness or erythema and the buttock/perirectal area, no crepitus present   Musculoskeletal:         General: No signs of injury. Skin:     General: Skin is warm. Coloration: Skin is not jaundiced.    Neurological: General: No focal deficit present. Mental Status: She is alert and oriented to person, place, and time.    Psychiatric:         Mood and Affect: Mood normal.         Behavior: Behavior normal.